# Patient Record
Sex: MALE | Race: BLACK OR AFRICAN AMERICAN | NOT HISPANIC OR LATINO | Employment: FULL TIME | RURAL
[De-identification: names, ages, dates, MRNs, and addresses within clinical notes are randomized per-mention and may not be internally consistent; named-entity substitution may affect disease eponyms.]

---

## 2020-04-22 ENCOUNTER — HISTORICAL (OUTPATIENT)
Dept: ADMINISTRATIVE | Facility: HOSPITAL | Age: 50
End: 2020-04-22

## 2021-05-10 ENCOUNTER — OFFICE VISIT (OUTPATIENT)
Dept: FAMILY MEDICINE | Facility: CLINIC | Age: 51
End: 2021-05-10
Payer: COMMERCIAL

## 2021-05-10 VITALS
HEART RATE: 93 BPM | BODY MASS INDEX: 34.36 KG/M2 | TEMPERATURE: 98 F | HEIGHT: 70 IN | DIASTOLIC BLOOD PRESSURE: 103 MMHG | SYSTOLIC BLOOD PRESSURE: 146 MMHG | OXYGEN SATURATION: 98 % | RESPIRATION RATE: 17 BRPM | WEIGHT: 240 LBS

## 2021-05-10 DIAGNOSIS — J32.9 SINUSITIS, UNSPECIFIED CHRONICITY, UNSPECIFIED LOCATION: Primary | ICD-10-CM

## 2021-05-10 PROCEDURE — 99214 PR OFFICE/OUTPT VISIT, EST, LEVL IV, 30-39 MIN: ICD-10-PCS | Mod: 25,,, | Performed by: FAMILY MEDICINE

## 2021-05-10 PROCEDURE — 99051 MED SERV EVE/WKEND/HOLIDAY: CPT | Mod: ,,, | Performed by: FAMILY MEDICINE

## 2021-05-10 PROCEDURE — 96372 PR INJECTION,THERAP/PROPH/DIAG2ST, IM OR SUBCUT: ICD-10-PCS | Mod: ,,, | Performed by: FAMILY MEDICINE

## 2021-05-10 PROCEDURE — 96372 THER/PROPH/DIAG INJ SC/IM: CPT | Mod: ,,, | Performed by: FAMILY MEDICINE

## 2021-05-10 PROCEDURE — 99051 PR MEDICAL SERVICES, EVE/WKEND/HOLIDAY: ICD-10-PCS | Mod: ,,, | Performed by: FAMILY MEDICINE

## 2021-05-10 PROCEDURE — 99214 OFFICE O/P EST MOD 30 MIN: CPT | Mod: 25,,, | Performed by: FAMILY MEDICINE

## 2021-05-10 RX ORDER — DEXAMETHASONE SODIUM PHOSPHATE 4 MG/ML
4 INJECTION, SOLUTION INTRA-ARTICULAR; INTRALESIONAL; INTRAMUSCULAR; INTRAVENOUS; SOFT TISSUE
Status: COMPLETED | OUTPATIENT
Start: 2021-05-10 | End: 2021-05-10

## 2021-05-10 RX ORDER — AMOXICILLIN AND CLAVULANATE POTASSIUM 875; 125 MG/1; MG/1
1 TABLET, FILM COATED ORAL 2 TIMES DAILY
Qty: 14 TABLET | Refills: 0 | Status: SHIPPED | OUTPATIENT
Start: 2021-05-10 | End: 2021-05-17

## 2021-05-10 RX ORDER — CEFTRIAXONE 1 G/1
1 INJECTION, POWDER, FOR SOLUTION INTRAMUSCULAR; INTRAVENOUS
Status: COMPLETED | OUTPATIENT
Start: 2021-05-10 | End: 2021-05-10

## 2021-05-10 RX ORDER — PREDNISONE 20 MG/1
20 TABLET ORAL DAILY
Qty: 5 TABLET | Refills: 0 | Status: SHIPPED | OUTPATIENT
Start: 2021-05-10 | End: 2021-05-15

## 2021-05-10 RX ADMIN — DEXAMETHASONE SODIUM PHOSPHATE 4 MG: 4 INJECTION, SOLUTION INTRA-ARTICULAR; INTRALESIONAL; INTRAMUSCULAR; INTRAVENOUS; SOFT TISSUE at 06:05

## 2021-05-10 RX ADMIN — CEFTRIAXONE 1 G: 1 INJECTION, POWDER, FOR SOLUTION INTRAMUSCULAR; INTRAVENOUS at 06:05

## 2022-11-08 ENCOUNTER — OFFICE VISIT (OUTPATIENT)
Dept: FAMILY MEDICINE | Facility: CLINIC | Age: 52
End: 2022-11-08
Payer: COMMERCIAL

## 2022-11-08 VITALS
RESPIRATION RATE: 16 BRPM | DIASTOLIC BLOOD PRESSURE: 81 MMHG | TEMPERATURE: 100 F | WEIGHT: 240 LBS | HEART RATE: 110 BPM | HEIGHT: 70 IN | BODY MASS INDEX: 34.36 KG/M2 | OXYGEN SATURATION: 95 % | SYSTOLIC BLOOD PRESSURE: 131 MMHG

## 2022-11-08 DIAGNOSIS — G44.209 TENSION HEADACHE: Primary | ICD-10-CM

## 2022-11-08 DIAGNOSIS — J40 BRONCHITIS: ICD-10-CM

## 2022-11-08 DIAGNOSIS — M62.838 MUSCLE SPASM: ICD-10-CM

## 2022-11-08 DIAGNOSIS — Z20.828 EXPOSURE TO VIRAL DISEASE: ICD-10-CM

## 2022-11-08 LAB
CTP QC/QA: YES
FLUAV AG NPH QL: NEGATIVE
FLUBV AG NPH QL: NEGATIVE
SARS-COV-2 AG RESP QL IA.RAPID: NEGATIVE

## 2022-11-08 PROCEDURE — 3075F SYST BP GE 130 - 139MM HG: CPT | Mod: ,,, | Performed by: FAMILY MEDICINE

## 2022-11-08 PROCEDURE — 99051 PR MEDICAL SERVICES, EVE/WKEND/HOLIDAY: ICD-10-PCS | Mod: ,,, | Performed by: FAMILY MEDICINE

## 2022-11-08 PROCEDURE — 87428 SARSCOV & INF VIR A&B AG IA: CPT | Mod: QW,,, | Performed by: FAMILY MEDICINE

## 2022-11-08 PROCEDURE — 3008F BODY MASS INDEX DOCD: CPT | Mod: ,,, | Performed by: FAMILY MEDICINE

## 2022-11-08 PROCEDURE — 3079F DIAST BP 80-89 MM HG: CPT | Mod: ,,, | Performed by: FAMILY MEDICINE

## 2022-11-08 PROCEDURE — 99214 OFFICE O/P EST MOD 30 MIN: CPT | Mod: 25,,, | Performed by: FAMILY MEDICINE

## 2022-11-08 PROCEDURE — 1159F PR MEDICATION LIST DOCUMENTED IN MEDICAL RECORD: ICD-10-PCS | Mod: ,,, | Performed by: FAMILY MEDICINE

## 2022-11-08 PROCEDURE — 3008F PR BODY MASS INDEX (BMI) DOCUMENTED: ICD-10-PCS | Mod: ,,, | Performed by: FAMILY MEDICINE

## 2022-11-08 PROCEDURE — 99051 MED SERV EVE/WKEND/HOLIDAY: CPT | Mod: ,,, | Performed by: FAMILY MEDICINE

## 2022-11-08 PROCEDURE — 96372 THER/PROPH/DIAG INJ SC/IM: CPT | Mod: ,,, | Performed by: FAMILY MEDICINE

## 2022-11-08 PROCEDURE — 99214 PR OFFICE/OUTPT VISIT, EST, LEVL IV, 30-39 MIN: ICD-10-PCS | Mod: 25,,, | Performed by: FAMILY MEDICINE

## 2022-11-08 PROCEDURE — 3079F PR MOST RECENT DIASTOLIC BLOOD PRESSURE 80-89 MM HG: ICD-10-PCS | Mod: ,,, | Performed by: FAMILY MEDICINE

## 2022-11-08 PROCEDURE — 1159F MED LIST DOCD IN RCRD: CPT | Mod: ,,, | Performed by: FAMILY MEDICINE

## 2022-11-08 PROCEDURE — 87428 POCT SARS-COV2 (COVID) WITH FLU ANTIGEN: ICD-10-PCS | Mod: QW,,, | Performed by: FAMILY MEDICINE

## 2022-11-08 PROCEDURE — 96372 PR INJECTION,THERAP/PROPH/DIAG2ST, IM OR SUBCUT: ICD-10-PCS | Mod: ,,, | Performed by: FAMILY MEDICINE

## 2022-11-08 PROCEDURE — 3075F PR MOST RECENT SYSTOLIC BLOOD PRESS GE 130-139MM HG: ICD-10-PCS | Mod: ,,, | Performed by: FAMILY MEDICINE

## 2022-11-08 PROCEDURE — 1160F RVW MEDS BY RX/DR IN RCRD: CPT | Mod: ,,, | Performed by: FAMILY MEDICINE

## 2022-11-08 PROCEDURE — 1160F PR REVIEW ALL MEDS BY PRESCRIBER/CLIN PHARMACIST DOCUMENTED: ICD-10-PCS | Mod: ,,, | Performed by: FAMILY MEDICINE

## 2022-11-08 RX ORDER — KETOROLAC TROMETHAMINE 30 MG/ML
60 INJECTION, SOLUTION INTRAMUSCULAR; INTRAVENOUS
Status: COMPLETED | OUTPATIENT
Start: 2022-11-08 | End: 2022-11-08

## 2022-11-08 RX ORDER — DEXAMETHASONE SODIUM PHOSPHATE 4 MG/ML
4 INJECTION, SOLUTION INTRA-ARTICULAR; INTRALESIONAL; INTRAMUSCULAR; INTRAVENOUS; SOFT TISSUE
Status: COMPLETED | OUTPATIENT
Start: 2022-11-08 | End: 2022-11-08

## 2022-11-08 RX ORDER — TIZANIDINE 4 MG/1
4 TABLET ORAL 3 TIMES DAILY PRN
Qty: 20 TABLET | Refills: 0 | Status: SHIPPED | OUTPATIENT
Start: 2022-11-08 | End: 2022-11-18

## 2022-11-08 RX ORDER — IBUPROFEN 800 MG/1
800 TABLET ORAL 3 TIMES DAILY PRN
Qty: 20 TABLET | Refills: 0 | Status: SHIPPED | OUTPATIENT
Start: 2022-11-08

## 2022-11-08 RX ORDER — AZITHROMYCIN 250 MG/1
TABLET, FILM COATED ORAL
Qty: 6 TABLET | Refills: 0 | Status: SHIPPED | OUTPATIENT
Start: 2022-11-08

## 2022-11-08 RX ORDER — PREDNISONE 20 MG/1
40 TABLET ORAL DAILY
Qty: 10 TABLET | Refills: 0 | Status: SHIPPED | OUTPATIENT
Start: 2022-11-08 | End: 2022-11-13

## 2022-11-08 RX ADMIN — DEXAMETHASONE SODIUM PHOSPHATE 4 MG: 4 INJECTION, SOLUTION INTRA-ARTICULAR; INTRALESIONAL; INTRAMUSCULAR; INTRAVENOUS; SOFT TISSUE at 07:11

## 2022-11-08 RX ADMIN — KETOROLAC TROMETHAMINE 60 MG: 30 INJECTION, SOLUTION INTRAMUSCULAR; INTRAVENOUS at 07:11

## 2022-11-08 NOTE — LETTER
November 8, 2022      [facility]  [address] [place] [address]  Phone: [phone]  Fax: [phone]       Patient: Zachariah Aguilar   YOB: 1970  Date of Visit: 11/08/2022    To Whom It May Concern:    Ilya Aguilar  was at [facility] on 11/08/2022. The patient may return to work/school on 11/10/22 with no restrictions. If you have any questions or concerns, or if I can be of further assistance, please do not hesitate to contact me.    Sincerely,    Petra Henry LPN

## 2022-11-09 NOTE — PROGRESS NOTES
Subjective:       Patient ID: Zachariah Aguilar is a 52 y.o. male.    Chief Complaint: Headache (X 1 week. ), Dizziness, Cough, and Chest Congestion    Pt reports 1 week of intermittent posterior headache and neck pain extending to upper thoracic back pain. Pt also with cough, congestion, drainage during this time. Denies visual changes, no unilateral weakness.     Review of Systems   Constitutional:  Negative for activity change, appetite change, chills, diaphoresis, fatigue, fever and unexpected weight change.   HENT:  Positive for postnasal drip and rhinorrhea. Negative for nasal congestion, dental problem, drooling, ear discharge, ear pain, facial swelling, hearing loss, mouth sores, nosebleeds, sinus pressure/congestion, sneezing, sore throat, tinnitus, trouble swallowing, voice change and goiter.    Eyes:  Negative for photophobia, pain, discharge, redness, itching and visual disturbance.   Respiratory:  Negative for apnea, cough, choking, chest tightness, shortness of breath, wheezing and stridor.    Cardiovascular:  Negative for chest pain, palpitations, leg swelling and claudication.   Gastrointestinal:  Negative for abdominal distention, abdominal pain, anal bleeding, blood in stool, change in bowel habit, constipation, diarrhea, nausea, vomiting, reflux, fecal incontinence and change in bowel habit.   Endocrine: Negative for cold intolerance, heat intolerance, polydipsia, polyphagia and polyuria.   Genitourinary:  Negative for bladder incontinence, decreased urine volume, difficulty urinating, discharge, dysuria, enuresis, erectile dysfunction, flank pain, frequency, genital sores, hematuria, penile pain, testicular pain and urgency.   Musculoskeletal:  Positive for arthralgias, back pain and myalgias. Negative for gait problem, joint swelling, leg pain, neck pain, neck stiffness and joint deformity.   Integumentary:  Negative for pallor, rash, wound and mole/lesion.   Allergic/Immunologic: Negative for  environmental allergies, food allergies and frequent infections.   Neurological:  Positive for headaches. Negative for dizziness, vertigo, tremors, seizures, syncope, facial asymmetry, speech difficulty, weakness, light-headedness, numbness, coordination difficulties, memory loss and coordination difficulties.   Hematological:  Negative for adenopathy. Does not bruise/bleed easily.   Psychiatric/Behavioral:  Negative for agitation, behavioral problems, confusion, decreased concentration, dysphoric mood, hallucinations, self-injury, sleep disturbance and suicidal ideas. The patient is not nervous/anxious and is not hyperactive.        Objective:      Physical Exam  Vitals reviewed.   Constitutional:       Appearance: Normal appearance. He is normal weight.   HENT:      Head: Normocephalic and atraumatic.      Right Ear: Tympanic membrane and ear canal normal.      Left Ear: Tympanic membrane, ear canal and external ear normal.      Nose: Congestion and rhinorrhea present.      Mouth/Throat:      Mouth: Mucous membranes are moist.      Pharynx: Oropharynx is clear.   Eyes:      Extraocular Movements: Extraocular movements intact.      Conjunctiva/sclera: Conjunctivae normal.      Pupils: Pupils are equal, round, and reactive to light.   Cardiovascular:      Rate and Rhythm: Normal rate and regular rhythm.      Pulses: Normal pulses.      Heart sounds: Normal heart sounds.   Pulmonary:      Effort: Pulmonary effort is normal.      Breath sounds: Normal breath sounds.   Abdominal:      General: Abdomen is flat. Bowel sounds are normal.      Palpations: Abdomen is soft.   Musculoskeletal:         General: Tenderness present. Normal range of motion.      Cervical back: Normal range of motion and neck supple.      Comments: Posterior scalp ttp. Posterior neck ttp.    Skin:     General: Skin is warm and dry.   Neurological:      General: No focal deficit present.      Mental Status: He is alert and oriented to person,  place, and time. Mental status is at baseline.   Psychiatric:         Mood and Affect: Mood normal.         Behavior: Behavior normal.         Thought Content: Thought content normal.         Judgment: Judgment normal.       Assessment:       1. Tension headache    2. Exposure to viral disease    3. Muscle spasm    4. Bronchitis          Plan:     Tension headache  -     dexAMETHasone injection 4 mg  -     ketorolac injection 60 mg    Exposure to viral disease  -     POCT SARS-COV2 (COVID) with Flu Antigen    Muscle spasm    Bronchitis    Other orders  -     tiZANidine (ZANAFLEX) 4 MG tablet; Take 1 tablet (4 mg total) by mouth 3 (three) times daily as needed (spasm).  Dispense: 20 tablet; Refill: 0  -     ibuprofen (ADVIL,MOTRIN) 800 MG tablet; Take 1 tablet (800 mg total) by mouth 3 (three) times daily as needed for Pain.  Dispense: 20 tablet; Refill: 0  -     azithromycin (Z-ANTIONETTE) 250 MG tablet; Take 2 tablets by mouth on day 1; Take 1 tablet by mouth on days 2-5  Dispense: 6 tablet; Refill: 0  -     predniSONE (DELTASONE) 20 MG tablet; Take 2 tablets (40 mg total) by mouth once daily. for 5 days  Dispense: 10 tablet; Refill: 0

## 2022-11-23 ENCOUNTER — OFFICE VISIT (OUTPATIENT)
Dept: FAMILY MEDICINE | Facility: CLINIC | Age: 52
End: 2022-11-23
Payer: COMMERCIAL

## 2022-11-23 VITALS
SYSTOLIC BLOOD PRESSURE: 130 MMHG | BODY MASS INDEX: 34.53 KG/M2 | TEMPERATURE: 102 F | DIASTOLIC BLOOD PRESSURE: 76 MMHG | RESPIRATION RATE: 19 BRPM | HEART RATE: 108 BPM | HEIGHT: 70 IN | WEIGHT: 241.19 LBS | OXYGEN SATURATION: 99 %

## 2022-11-23 DIAGNOSIS — Z11.52 ENCOUNTER FOR SCREENING LABORATORY TESTING FOR COVID-19 VIRUS: ICD-10-CM

## 2022-11-23 DIAGNOSIS — J11.1 INFLUENZA: Primary | ICD-10-CM

## 2022-11-23 LAB
CTP QC/QA: YES
FLUAV AG NPH QL: POSITIVE
FLUBV AG NPH QL: NEGATIVE
SARS-COV-2 AG RESP QL IA.RAPID: NEGATIVE

## 2022-11-23 PROCEDURE — 3075F PR MOST RECENT SYSTOLIC BLOOD PRESS GE 130-139MM HG: ICD-10-PCS | Mod: ,,, | Performed by: FAMILY MEDICINE

## 2022-11-23 PROCEDURE — 87428 SARSCOV & INF VIR A&B AG IA: CPT | Mod: QW,,, | Performed by: FAMILY MEDICINE

## 2022-11-23 PROCEDURE — 3078F DIAST BP <80 MM HG: CPT | Mod: ,,, | Performed by: FAMILY MEDICINE

## 2022-11-23 PROCEDURE — 1160F RVW MEDS BY RX/DR IN RCRD: CPT | Mod: ,,, | Performed by: FAMILY MEDICINE

## 2022-11-23 PROCEDURE — 96372 THER/PROPH/DIAG INJ SC/IM: CPT | Mod: ,,, | Performed by: FAMILY MEDICINE

## 2022-11-23 PROCEDURE — 3078F PR MOST RECENT DIASTOLIC BLOOD PRESSURE < 80 MM HG: ICD-10-PCS | Mod: ,,, | Performed by: FAMILY MEDICINE

## 2022-11-23 PROCEDURE — 99214 OFFICE O/P EST MOD 30 MIN: CPT | Mod: 25,,, | Performed by: FAMILY MEDICINE

## 2022-11-23 PROCEDURE — 87428 POCT SARS-COV2 (COVID) WITH FLU ANTIGEN: ICD-10-PCS | Mod: QW,,, | Performed by: FAMILY MEDICINE

## 2022-11-23 PROCEDURE — 99214 PR OFFICE/OUTPT VISIT, EST, LEVL IV, 30-39 MIN: ICD-10-PCS | Mod: 25,,, | Performed by: FAMILY MEDICINE

## 2022-11-23 PROCEDURE — 1159F PR MEDICATION LIST DOCUMENTED IN MEDICAL RECORD: ICD-10-PCS | Mod: ,,, | Performed by: FAMILY MEDICINE

## 2022-11-23 PROCEDURE — 1160F PR REVIEW ALL MEDS BY PRESCRIBER/CLIN PHARMACIST DOCUMENTED: ICD-10-PCS | Mod: ,,, | Performed by: FAMILY MEDICINE

## 2022-11-23 PROCEDURE — 96372 PR INJECTION,THERAP/PROPH/DIAG2ST, IM OR SUBCUT: ICD-10-PCS | Mod: ,,, | Performed by: FAMILY MEDICINE

## 2022-11-23 PROCEDURE — 3008F PR BODY MASS INDEX (BMI) DOCUMENTED: ICD-10-PCS | Mod: ,,, | Performed by: FAMILY MEDICINE

## 2022-11-23 PROCEDURE — 3075F SYST BP GE 130 - 139MM HG: CPT | Mod: ,,, | Performed by: FAMILY MEDICINE

## 2022-11-23 PROCEDURE — 1159F MED LIST DOCD IN RCRD: CPT | Mod: ,,, | Performed by: FAMILY MEDICINE

## 2022-11-23 PROCEDURE — 3008F BODY MASS INDEX DOCD: CPT | Mod: ,,, | Performed by: FAMILY MEDICINE

## 2022-11-23 RX ORDER — DEXAMETHASONE SODIUM PHOSPHATE 4 MG/ML
4 INJECTION, SOLUTION INTRA-ARTICULAR; INTRALESIONAL; INTRAMUSCULAR; INTRAVENOUS; SOFT TISSUE
Status: COMPLETED | OUTPATIENT
Start: 2022-11-23 | End: 2022-11-23

## 2022-11-23 RX ORDER — PREDNISONE 20 MG/1
20 TABLET ORAL DAILY
Qty: 5 TABLET | Refills: 0 | Status: SHIPPED | OUTPATIENT
Start: 2022-11-23 | End: 2022-11-28

## 2022-11-23 RX ORDER — BENZONATATE 100 MG/1
100 CAPSULE ORAL 3 TIMES DAILY PRN
Qty: 20 CAPSULE | Refills: 0 | Status: SHIPPED | OUTPATIENT
Start: 2022-11-23

## 2022-11-23 RX ORDER — OSELTAMIVIR PHOSPHATE 75 MG/1
75 CAPSULE ORAL 2 TIMES DAILY
Qty: 10 CAPSULE | Refills: 0 | Status: SHIPPED | OUTPATIENT
Start: 2022-11-23 | End: 2022-11-28

## 2022-11-23 RX ADMIN — DEXAMETHASONE SODIUM PHOSPHATE 4 MG: 4 INJECTION, SOLUTION INTRA-ARTICULAR; INTRALESIONAL; INTRAMUSCULAR; INTRAVENOUS; SOFT TISSUE at 04:11

## 2022-11-23 NOTE — PROGRESS NOTES
Subjective:       Patient ID: Zachariah Aguilar is a 52 y.o. male.    Chief Complaint: Fever (Fever/chills/body aches/runny nose/cough symptoms started last night)    HPI  Review of Systems   Constitutional:  Positive for fatigue and fever. Negative for activity change, appetite change, chills, diaphoresis and unexpected weight change.   HENT:  Positive for nasal congestion, postnasal drip, rhinorrhea and sinus pressure/congestion. Negative for dental problem, drooling, ear discharge, ear pain, facial swelling, hearing loss, mouth sores, nosebleeds, sneezing, sore throat, tinnitus, trouble swallowing, voice change and goiter.    Eyes:  Negative for photophobia, pain, discharge, redness, itching and visual disturbance.   Respiratory:  Positive for cough. Negative for apnea, choking, chest tightness, shortness of breath, wheezing and stridor.    Cardiovascular:  Negative for chest pain, palpitations, leg swelling and claudication.   Gastrointestinal:  Negative for abdominal distention, abdominal pain, anal bleeding, blood in stool, change in bowel habit, constipation, diarrhea, nausea, vomiting, reflux, fecal incontinence and change in bowel habit.   Endocrine: Negative for cold intolerance, heat intolerance, polydipsia, polyphagia and polyuria.   Genitourinary:  Negative for bladder incontinence, decreased urine volume, difficulty urinating, discharge, dysuria, enuresis, erectile dysfunction, flank pain, frequency, genital sores, hematuria, penile pain, testicular pain and urgency.   Musculoskeletal:  Negative for arthralgias, back pain, gait problem, joint swelling, leg pain, myalgias, neck pain, neck stiffness and joint deformity.   Integumentary:  Negative for pallor, rash, wound and mole/lesion.   Allergic/Immunologic: Negative for environmental allergies, food allergies and frequent infections.   Neurological:  Negative for dizziness, vertigo, tremors, seizures, syncope, facial asymmetry, speech difficulty,  weakness, light-headedness, numbness, headaches, coordination difficulties, memory loss and coordination difficulties.   Hematological:  Negative for adenopathy. Does not bruise/bleed easily.   Psychiatric/Behavioral:  Negative for agitation, behavioral problems, confusion, decreased concentration, dysphoric mood, hallucinations, self-injury, sleep disturbance and suicidal ideas. The patient is not nervous/anxious and is not hyperactive.        Objective:      Physical Exam  Vitals reviewed.   Constitutional:       Appearance: Normal appearance. He is normal weight.   HENT:      Head: Normocephalic and atraumatic.      Right Ear: Tympanic membrane and ear canal normal.      Left Ear: Tympanic membrane, ear canal and external ear normal.      Nose: Congestion and rhinorrhea present.      Mouth/Throat:      Mouth: Mucous membranes are moist.      Pharynx: Oropharynx is clear. Posterior oropharyngeal erythema present.   Eyes:      Extraocular Movements: Extraocular movements intact.      Conjunctiva/sclera: Conjunctivae normal.      Pupils: Pupils are equal, round, and reactive to light.   Cardiovascular:      Rate and Rhythm: Normal rate and regular rhythm.      Pulses: Normal pulses.      Heart sounds: Normal heart sounds.   Pulmonary:      Effort: Pulmonary effort is normal.      Breath sounds: Normal breath sounds.   Abdominal:      General: Abdomen is flat. Bowel sounds are normal.      Palpations: Abdomen is soft.   Musculoskeletal:         General: Normal range of motion.      Cervical back: Normal range of motion and neck supple.   Skin:     General: Skin is warm and dry.   Neurological:      General: No focal deficit present.      Mental Status: He is alert and oriented to person, place, and time. Mental status is at baseline.   Psychiatric:         Mood and Affect: Mood normal.         Behavior: Behavior normal.         Thought Content: Thought content normal.         Judgment: Judgment normal.        Assessment:       1. Influenza    2. Encounter for screening laboratory testing for COVID-19 virus          Plan:     Influenza  -     dexAMETHasone injection 4 mg    Encounter for screening laboratory testing for COVID-19 virus  -     POCT SARS-COV2 (COVID) with Flu Antigen    Other orders  -     predniSONE (DELTASONE) 20 MG tablet; Take 1 tablet (20 mg total) by mouth once daily. for 5 days  Dispense: 5 tablet; Refill: 0  -     oseltamivir (TAMIFLU) 75 MG capsule; Take 1 capsule (75 mg total) by mouth 2 (two) times daily. for 5 days  Dispense: 10 capsule; Refill: 0  -     benzonatate (TESSALON) 100 MG capsule; Take 1 capsule (100 mg total) by mouth 3 (three) times daily as needed for Cough.  Dispense: 20 capsule; Refill: 0

## 2023-02-10 ENCOUNTER — OFFICE VISIT (OUTPATIENT)
Dept: FAMILY MEDICINE | Facility: CLINIC | Age: 53
End: 2023-02-10
Payer: COMMERCIAL

## 2023-02-10 VITALS
TEMPERATURE: 98 F | OXYGEN SATURATION: 99 % | DIASTOLIC BLOOD PRESSURE: 94 MMHG | HEART RATE: 82 BPM | BODY MASS INDEX: 32.64 KG/M2 | SYSTOLIC BLOOD PRESSURE: 137 MMHG | HEIGHT: 70 IN | WEIGHT: 228 LBS | RESPIRATION RATE: 20 BRPM

## 2023-02-10 DIAGNOSIS — U07.1 COVID-19 VIRUS INFECTION: ICD-10-CM

## 2023-02-10 DIAGNOSIS — Z20.828 CONTACT WITH OR EXPOSURE TO VIRAL DISEASE: Primary | ICD-10-CM

## 2023-02-10 LAB
CTP QC/QA: YES
SARS-COV-2 AG RESP QL IA.RAPID: POSITIVE

## 2023-02-10 PROCEDURE — 3080F PR MOST RECENT DIASTOLIC BLOOD PRESSURE >= 90 MM HG: ICD-10-PCS | Mod: ,,, | Performed by: FAMILY MEDICINE

## 2023-02-10 PROCEDURE — 3075F PR MOST RECENT SYSTOLIC BLOOD PRESS GE 130-139MM HG: ICD-10-PCS | Mod: ,,, | Performed by: FAMILY MEDICINE

## 2023-02-10 PROCEDURE — 3008F BODY MASS INDEX DOCD: CPT | Mod: ,,, | Performed by: FAMILY MEDICINE

## 2023-02-10 PROCEDURE — 3080F DIAST BP >= 90 MM HG: CPT | Mod: ,,, | Performed by: FAMILY MEDICINE

## 2023-02-10 PROCEDURE — 87426 SARS CORONAVIRUS 2 ANTIGEN POCT: ICD-10-PCS | Mod: QW,,, | Performed by: FAMILY MEDICINE

## 2023-02-10 PROCEDURE — 99213 OFFICE O/P EST LOW 20 MIN: CPT | Mod: ,,, | Performed by: FAMILY MEDICINE

## 2023-02-10 PROCEDURE — 99213 PR OFFICE/OUTPT VISIT, EST, LEVL III, 20-29 MIN: ICD-10-PCS | Mod: ,,, | Performed by: FAMILY MEDICINE

## 2023-02-10 PROCEDURE — 87426 SARSCOV CORONAVIRUS AG IA: CPT | Mod: QW,,, | Performed by: FAMILY MEDICINE

## 2023-02-10 PROCEDURE — 1159F MED LIST DOCD IN RCRD: CPT | Mod: ,,, | Performed by: FAMILY MEDICINE

## 2023-02-10 PROCEDURE — 3008F PR BODY MASS INDEX (BMI) DOCUMENTED: ICD-10-PCS | Mod: ,,, | Performed by: FAMILY MEDICINE

## 2023-02-10 PROCEDURE — 1159F PR MEDICATION LIST DOCUMENTED IN MEDICAL RECORD: ICD-10-PCS | Mod: ,,, | Performed by: FAMILY MEDICINE

## 2023-02-10 PROCEDURE — 3075F SYST BP GE 130 - 139MM HG: CPT | Mod: ,,, | Performed by: FAMILY MEDICINE

## 2023-02-10 NOTE — LETTER
February 10, 2023      [facility]  [address] [facility] [place] [address]  Phone: [phone]  Fax: [phone]       Patient: Zachariah Aguilar   YOB: 1970  Date of Visit: 02/10/2023    To Whom It May Concern:    Ilya Aguilar  was at [facility] on 02/10/2023. The patient may return to work/school on 02/17/2023 with no restrictions. If you have any questions or concerns, or if I can be of further assistance, please do not hesitate to contact me.    Sincerely,    Gabo Zaragoza MD

## 2023-02-10 NOTE — PROGRESS NOTES
Subjective:       Patient ID: Zachariah Aguilar is a 53 y.o. male.    Chief Complaint: COVID-19 Concerns (Cough, congestion, positive home covid test)    SYMPTOMS BEGAN LITTLE OVER 2 DAYS AGO.  NO SHORTNESS OF BREATH    Review of Systems      Objective:      Physical Exam  Constitutional:       Appearance: Normal appearance. He is ill-appearing (mildly). He is not toxic-appearing.   HENT:      Nose: Congestion and rhinorrhea present.      Mouth/Throat:      Pharynx: No posterior oropharyngeal erythema.   Cardiovascular:      Rate and Rhythm: Normal rate and regular rhythm.   Pulmonary:      Effort: Pulmonary effort is normal.      Breath sounds: Normal breath sounds.   Neurological:      Mental Status: He is alert.       Assessment:       Problem List Items Addressed This Visit    None  Visit Diagnoses       Contact with or exposure to viral disease    -  Primary    Relevant Orders    SARS Coronavirus 2 Antigen, POCT (Completed)    COVID-19 virus infection                  Plan:       Paxlovid.  Quarantine for 7 more days

## 2023-07-13 ENCOUNTER — OFFICE VISIT (OUTPATIENT)
Dept: FAMILY MEDICINE | Facility: CLINIC | Age: 53
End: 2023-07-13
Payer: COMMERCIAL

## 2023-07-13 VITALS
WEIGHT: 237.38 LBS | SYSTOLIC BLOOD PRESSURE: 134 MMHG | BODY MASS INDEX: 33.98 KG/M2 | TEMPERATURE: 99 F | OXYGEN SATURATION: 99 % | HEIGHT: 70 IN | HEART RATE: 75 BPM | DIASTOLIC BLOOD PRESSURE: 87 MMHG

## 2023-07-13 DIAGNOSIS — S57.82XA CRUSHING INJURY OF LEFT FOREARM, INITIAL ENCOUNTER: Primary | ICD-10-CM

## 2023-07-13 PROCEDURE — 99213 PR OFFICE/OUTPT VISIT, EST, LEVL III, 20-29 MIN: ICD-10-PCS | Mod: ,,, | Performed by: FAMILY MEDICINE

## 2023-07-13 PROCEDURE — 3075F PR MOST RECENT SYSTOLIC BLOOD PRESS GE 130-139MM HG: ICD-10-PCS | Mod: ,,, | Performed by: FAMILY MEDICINE

## 2023-07-13 PROCEDURE — 3079F PR MOST RECENT DIASTOLIC BLOOD PRESSURE 80-89 MM HG: ICD-10-PCS | Mod: ,,, | Performed by: FAMILY MEDICINE

## 2023-07-13 PROCEDURE — 99051 MED SERV EVE/WKEND/HOLIDAY: CPT | Mod: ,,, | Performed by: FAMILY MEDICINE

## 2023-07-13 PROCEDURE — 3008F BODY MASS INDEX DOCD: CPT | Mod: ,,, | Performed by: FAMILY MEDICINE

## 2023-07-13 PROCEDURE — 3079F DIAST BP 80-89 MM HG: CPT | Mod: ,,, | Performed by: FAMILY MEDICINE

## 2023-07-13 PROCEDURE — 99213 OFFICE O/P EST LOW 20 MIN: CPT | Mod: ,,, | Performed by: FAMILY MEDICINE

## 2023-07-13 PROCEDURE — 99051 PR MEDICAL SERVICES, EVE/WKEND/HOLIDAY: ICD-10-PCS | Mod: ,,, | Performed by: FAMILY MEDICINE

## 2023-07-13 PROCEDURE — 3008F PR BODY MASS INDEX (BMI) DOCUMENTED: ICD-10-PCS | Mod: ,,, | Performed by: FAMILY MEDICINE

## 2023-07-13 PROCEDURE — 3075F SYST BP GE 130 - 139MM HG: CPT | Mod: ,,, | Performed by: FAMILY MEDICINE

## 2023-07-13 PROCEDURE — 1159F MED LIST DOCD IN RCRD: CPT | Mod: ,,, | Performed by: FAMILY MEDICINE

## 2023-07-13 PROCEDURE — 1159F PR MEDICATION LIST DOCUMENTED IN MEDICAL RECORD: ICD-10-PCS | Mod: ,,, | Performed by: FAMILY MEDICINE
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
## 2023-12-20 ENCOUNTER — OFFICE VISIT (OUTPATIENT)
Dept: FAMILY MEDICINE | Facility: CLINIC | Age: 53
End: 2023-12-20
Payer: COMMERCIAL

## 2023-12-20 VITALS
WEIGHT: 240 LBS | BODY MASS INDEX: 34.36 KG/M2 | TEMPERATURE: 98 F | DIASTOLIC BLOOD PRESSURE: 84 MMHG | OXYGEN SATURATION: 98 % | HEIGHT: 70 IN | HEART RATE: 77 BPM | SYSTOLIC BLOOD PRESSURE: 136 MMHG

## 2023-12-20 DIAGNOSIS — Z20.828 EXPOSURE TO VIRAL DISEASE: ICD-10-CM

## 2023-12-20 DIAGNOSIS — J32.9 SINUSITIS, UNSPECIFIED CHRONICITY, UNSPECIFIED LOCATION: ICD-10-CM

## 2023-12-20 DIAGNOSIS — R10.84 GENERALIZED ABDOMINAL PAIN: Primary | ICD-10-CM

## 2023-12-20 LAB
CTP QC/QA: YES
CTP QC/QA: YES
FLUAV AG NPH QL: NEGATIVE
FLUBV AG NPH QL: NEGATIVE
SARS-COV-2 AG RESP QL IA.RAPID: NEGATIVE

## 2023-12-20 PROCEDURE — 3008F BODY MASS INDEX DOCD: CPT | Mod: ,,, | Performed by: FAMILY MEDICINE

## 2023-12-20 PROCEDURE — 87804 INFLUENZA ASSAY W/OPTIC: CPT | Mod: 59,QW,, | Performed by: FAMILY MEDICINE

## 2023-12-20 PROCEDURE — 3079F DIAST BP 80-89 MM HG: CPT | Mod: ,,, | Performed by: FAMILY MEDICINE

## 2023-12-20 PROCEDURE — 1159F PR MEDICATION LIST DOCUMENTED IN MEDICAL RECORD: ICD-10-PCS | Mod: ,,, | Performed by: FAMILY MEDICINE

## 2023-12-20 PROCEDURE — 87426 SARSCOV CORONAVIRUS AG IA: CPT | Mod: QW,,, | Performed by: FAMILY MEDICINE

## 2023-12-20 PROCEDURE — 99214 PR OFFICE/OUTPT VISIT, EST, LEVL IV, 30-39 MIN: ICD-10-PCS | Mod: 25,,, | Performed by: FAMILY MEDICINE

## 2023-12-20 PROCEDURE — 1159F MED LIST DOCD IN RCRD: CPT | Mod: ,,, | Performed by: FAMILY MEDICINE

## 2023-12-20 PROCEDURE — 3079F PR MOST RECENT DIASTOLIC BLOOD PRESSURE 80-89 MM HG: ICD-10-PCS | Mod: ,,, | Performed by: FAMILY MEDICINE

## 2023-12-20 PROCEDURE — 96372 PR INJECTION,THERAP/PROPH/DIAG2ST, IM OR SUBCUT: ICD-10-PCS | Mod: ,,, | Performed by: FAMILY MEDICINE

## 2023-12-20 PROCEDURE — 96372 THER/PROPH/DIAG INJ SC/IM: CPT | Mod: ,,, | Performed by: FAMILY MEDICINE

## 2023-12-20 PROCEDURE — 1160F RVW MEDS BY RX/DR IN RCRD: CPT | Mod: ,,, | Performed by: FAMILY MEDICINE

## 2023-12-20 PROCEDURE — 3075F PR MOST RECENT SYSTOLIC BLOOD PRESS GE 130-139MM HG: ICD-10-PCS | Mod: ,,, | Performed by: FAMILY MEDICINE

## 2023-12-20 PROCEDURE — 1160F PR REVIEW ALL MEDS BY PRESCRIBER/CLIN PHARMACIST DOCUMENTED: ICD-10-PCS | Mod: ,,, | Performed by: FAMILY MEDICINE

## 2023-12-20 PROCEDURE — 3008F PR BODY MASS INDEX (BMI) DOCUMENTED: ICD-10-PCS | Mod: ,,, | Performed by: FAMILY MEDICINE

## 2023-12-20 PROCEDURE — 87804 POCT INFLUENZA A/B: ICD-10-PCS | Mod: 59,QW,, | Performed by: FAMILY MEDICINE

## 2023-12-20 PROCEDURE — 99214 OFFICE O/P EST MOD 30 MIN: CPT | Mod: 25,,, | Performed by: FAMILY MEDICINE

## 2023-12-20 PROCEDURE — 3075F SYST BP GE 130 - 139MM HG: CPT | Mod: ,,, | Performed by: FAMILY MEDICINE

## 2023-12-20 PROCEDURE — 87426 SARS CORONAVIRUS 2 ANTIGEN POCT: ICD-10-PCS | Mod: QW,,, | Performed by: FAMILY MEDICINE

## 2023-12-20 RX ORDER — POLYETHYLENE GLYCOL 3350 17 G/17G
POWDER, FOR SOLUTION ORAL
Qty: 507 G | Refills: 0 | Status: SHIPPED | OUTPATIENT
Start: 2023-12-20

## 2023-12-20 RX ORDER — BISACODYL 5 MG
15 TABLET, DELAYED RELEASE (ENTERIC COATED) ORAL ONCE
Qty: 3 TABLET | Refills: 0 | Status: SHIPPED | OUTPATIENT
Start: 2023-12-20 | End: 2023-12-20

## 2023-12-20 RX ORDER — PREDNISONE 20 MG/1
20 TABLET ORAL DAILY
Qty: 5 TABLET | Refills: 0 | Status: SHIPPED | OUTPATIENT
Start: 2023-12-20 | End: 2023-12-25

## 2023-12-20 RX ORDER — DEXAMETHASONE SODIUM PHOSPHATE 4 MG/ML
4 INJECTION, SOLUTION INTRA-ARTICULAR; INTRALESIONAL; INTRAMUSCULAR; INTRAVENOUS; SOFT TISSUE
Status: COMPLETED | OUTPATIENT
Start: 2023-12-20 | End: 2023-12-20

## 2023-12-20 RX ORDER — AMOXICILLIN AND CLAVULANATE POTASSIUM 875; 125 MG/1; MG/1
1 TABLET, FILM COATED ORAL 2 TIMES DAILY
Qty: 14 TABLET | Refills: 0 | Status: SHIPPED | OUTPATIENT
Start: 2023-12-20 | End: 2023-12-27

## 2023-12-20 RX ORDER — ONDANSETRON 4 MG/1
4 TABLET, FILM COATED ORAL EVERY 8 HOURS PRN
Qty: 10 TABLET | Refills: 0 | Status: SHIPPED | OUTPATIENT
Start: 2023-12-20

## 2023-12-20 RX ADMIN — DEXAMETHASONE SODIUM PHOSPHATE 4 MG: 4 INJECTION, SOLUTION INTRA-ARTICULAR; INTRALESIONAL; INTRAMUSCULAR; INTRAVENOUS; SOFT TISSUE at 07:12

## 2023-12-20 NOTE — LETTER
December 20, 2023      [facility]  [address] [facility] [place] [address]  Phone: [phone]  Fax: [phone]       Patient: Zachariah Aguilar   YOB: 1970  Date of Visit: 12/20/2023    To Whom It May Concern:    Ilya Aguilar  was at [facility] on 12/20/2023. The patient may return to work/school on 12/26/2023 with no restrictions. If you have any questions or concerns, or if I can be of further assistance, please do not hesitate to contact me.    Sincerely,    Dr. Ramu Ho II

## 2023-12-21 NOTE — PROGRESS NOTES
Subjective:       Patient ID: Zachariah Aguilar is a 53 y.o. male.    Chief Complaint: Abdominal Pain and Cough    Pt with left sided abdominal swelling and left inguinal swelling x 5 days, intermittent pain, last episode of pain was last night.     Pt also with cough and congestion x 1 week.     Abdominal Pain  Pertinent negatives include no arthralgias, constipation, diarrhea, dysuria, fever, frequency, headaches, hematuria, myalgias, nausea or vomiting.   Cough  Pertinent negatives include no chest pain, chills, ear pain, eye redness, fever, headaches, myalgias, postnasal drip, rash, rhinorrhea, sore throat, shortness of breath or wheezing. There is no history of environmental allergies.     Review of Systems   Constitutional:  Negative for activity change, appetite change, chills, diaphoresis, fatigue, fever and unexpected weight change.   HENT:  Negative for nasal congestion, dental problem, drooling, ear discharge, ear pain, facial swelling, hearing loss, mouth sores, nosebleeds, postnasal drip, rhinorrhea, sinus pressure/congestion, sneezing, sore throat, tinnitus, trouble swallowing, voice change and goiter.    Eyes:  Negative for photophobia, pain, discharge, redness, itching and visual disturbance.   Respiratory:  Positive for cough. Negative for apnea, choking, chest tightness, shortness of breath, wheezing and stridor.    Cardiovascular:  Negative for chest pain, palpitations, leg swelling and claudication.   Gastrointestinal:  Positive for abdominal pain. Negative for abdominal distention, anal bleeding, blood in stool, change in bowel habit, constipation, diarrhea, nausea, vomiting, reflux and fecal incontinence.   Endocrine: Negative for cold intolerance, heat intolerance, polydipsia, polyphagia and polyuria.   Genitourinary:  Negative for bladder incontinence, decreased urine volume, difficulty urinating, discharge, dysuria, enuresis, erectile dysfunction, flank pain, frequency, genital sores,  hematuria, penile pain, testicular pain and urgency.   Musculoskeletal:  Negative for arthralgias, back pain, gait problem, joint swelling, leg pain, myalgias, neck pain, neck stiffness and joint deformity.   Integumentary:  Negative for pallor, rash, wound and mole/lesion.   Allergic/Immunologic: Negative for environmental allergies, food allergies and frequent infections.   Neurological:  Negative for dizziness, vertigo, tremors, seizures, syncope, facial asymmetry, speech difficulty, weakness, light-headedness, numbness, headaches, memory loss and coordination difficulties.   Hematological:  Negative for adenopathy. Does not bruise/bleed easily.   Psychiatric/Behavioral:  Negative for agitation, behavioral problems, confusion, decreased concentration, dysphoric mood, hallucinations, self-injury, sleep disturbance and suicidal ideas. The patient is not nervous/anxious and is not hyperactive.          Objective:      Physical Exam  Vitals reviewed.   Constitutional:       Appearance: Normal appearance. He is normal weight.   HENT:      Head: Normocephalic and atraumatic.      Right Ear: Tympanic membrane and ear canal normal.      Left Ear: Tympanic membrane, ear canal and external ear normal.      Nose: Rhinorrhea present.      Mouth/Throat:      Mouth: Mucous membranes are moist.      Pharynx: Oropharynx is clear.   Eyes:      Extraocular Movements: Extraocular movements intact.      Conjunctiva/sclera: Conjunctivae normal.      Pupils: Pupils are equal, round, and reactive to light.   Cardiovascular:      Rate and Rhythm: Normal rate and regular rhythm.      Pulses: Normal pulses.      Heart sounds: Normal heart sounds.   Pulmonary:      Effort: Pulmonary effort is normal.      Breath sounds: Normal breath sounds.   Abdominal:      General: Abdomen is flat. Bowel sounds are normal.      Palpations: Abdomen is soft.      Comments: Left abdomen mildly swollen compared to right, left inguinal region swollen  compared to right.    Musculoskeletal:         General: Normal range of motion.      Cervical back: Normal range of motion and neck supple.   Skin:     General: Skin is warm and dry.   Neurological:      General: No focal deficit present.      Mental Status: He is alert and oriented to person, place, and time. Mental status is at baseline.   Psychiatric:         Mood and Affect: Mood normal.         Behavior: Behavior normal.         Thought Content: Thought content normal.         Judgment: Judgment normal.         Assessment:       1. Generalized abdominal pain    2. Exposure to viral disease    3. Sinusitis, unspecified chronicity, unspecified location        Plan:     Generalized abdominal pain  -     X-Ray KUB; Future; Expected date: 12/20/2023  -      Abdomen Complete; Future; Expected date: 12/20/2023    Exposure to viral disease  -     SARS Coronavirus 2 Antigen, POCT  -     POCT Influenza A/B Rapid Antigen    Sinusitis, unspecified chronicity, unspecified location  -     dexAMETHasone injection 4 mg    Other orders  -     amoxicillin-clavulanate 875-125mg (AUGMENTIN) 875-125 mg per tablet; Take 1 tablet by mouth 2 (two) times a day. for 7 days  Dispense: 14 tablet; Refill: 0  -     predniSONE (DELTASONE) 20 MG tablet; Take 1 tablet (20 mg total) by mouth once daily. for 5 days  Dispense: 5 tablet; Refill: 0  -     polyethylene glycol (GLYCOLAX) 17 gram/dose powder; 2 capfuls by mouth daily x 3 days then 1 capful daily  Dispense: 507 g; Refill: 0  -     bisacodyL (DULCOLAX) 5 mg EC tablet; Take 3 tablets (15 mg total) by mouth once. for 1 dose  Dispense: 3 tablet; Refill: 0  -     ondansetron (ZOFRAN) 4 MG tablet; Take 1 tablet (4 mg total) by mouth every 8 (eight) hours as needed for Nausea.  Dispense: 10 tablet; Refill: 0       Will treat for constipation and gas buildup, will get US. Will treat for sinusitis as well.

## 2024-01-22 ENCOUNTER — HOSPITAL ENCOUNTER (OUTPATIENT)
Dept: RADIOLOGY | Facility: HOSPITAL | Age: 54
Discharge: HOME | End: 2024-01-22
Attending: FAMILY MEDICINE
Payer: COMMERCIAL

## 2024-01-22 DIAGNOSIS — R10.84 GENERALIZED ABDOMINAL PAIN: ICD-10-CM

## 2024-01-22 PROCEDURE — 76700 US EXAM ABDOM COMPLETE: CPT | Mod: 26,,, | Performed by: RADIOLOGY

## 2024-01-22 PROCEDURE — 76700 US EXAM ABDOM COMPLETE: CPT | Mod: TC

## 2024-07-27 ENCOUNTER — OFFICE VISIT (OUTPATIENT)
Dept: FAMILY MEDICINE | Facility: CLINIC | Age: 54
End: 2024-07-27
Payer: COMMERCIAL

## 2024-07-27 VITALS
BODY MASS INDEX: 34.36 KG/M2 | HEIGHT: 70 IN | RESPIRATION RATE: 18 BRPM | SYSTOLIC BLOOD PRESSURE: 137 MMHG | OXYGEN SATURATION: 96 % | DIASTOLIC BLOOD PRESSURE: 91 MMHG | HEART RATE: 82 BPM | TEMPERATURE: 98 F | WEIGHT: 240 LBS

## 2024-07-27 DIAGNOSIS — S81.801A WOUND OF RIGHT LOWER EXTREMITY, INITIAL ENCOUNTER: Primary | ICD-10-CM

## 2024-07-27 PROBLEM — L84 CORNS AND CALLOSITIES: Status: ACTIVE | Noted: 2024-07-27

## 2024-07-27 LAB — GLUCOSE SERPL-MCNC: 88 MG/DL (ref 70–110)

## 2024-07-27 PROCEDURE — 80053 COMPREHEN METABOLIC PANEL: CPT | Mod: ,,, | Performed by: CLINICAL MEDICAL LABORATORY

## 2024-07-27 PROCEDURE — 1160F RVW MEDS BY RX/DR IN RCRD: CPT | Mod: ,,, | Performed by: NURSE PRACTITIONER

## 2024-07-27 PROCEDURE — 85025 COMPLETE CBC W/AUTO DIFF WBC: CPT | Mod: ,,, | Performed by: CLINICAL MEDICAL LABORATORY

## 2024-07-27 PROCEDURE — 3008F BODY MASS INDEX DOCD: CPT | Mod: ,,, | Performed by: NURSE PRACTITIONER

## 2024-07-27 PROCEDURE — 99213 OFFICE O/P EST LOW 20 MIN: CPT | Mod: ,,, | Performed by: NURSE PRACTITIONER

## 2024-07-27 PROCEDURE — 99051 MED SERV EVE/WKEND/HOLIDAY: CPT | Mod: ,,, | Performed by: NURSE PRACTITIONER

## 2024-07-27 PROCEDURE — 1159F MED LIST DOCD IN RCRD: CPT | Mod: ,,, | Performed by: NURSE PRACTITIONER

## 2024-07-27 PROCEDURE — 3080F DIAST BP >= 90 MM HG: CPT | Mod: ,,, | Performed by: NURSE PRACTITIONER

## 2024-07-27 PROCEDURE — 3075F SYST BP GE 130 - 139MM HG: CPT | Mod: ,,, | Performed by: NURSE PRACTITIONER

## 2024-07-27 RX ORDER — CLINDAMYCIN HYDROCHLORIDE 300 MG/1
300 CAPSULE ORAL 3 TIMES DAILY
Qty: 30 CAPSULE | Refills: 0 | Status: SHIPPED | OUTPATIENT
Start: 2024-07-27 | End: 2024-08-06

## 2024-07-27 RX ORDER — MUPIROCIN 20 MG/G
OINTMENT TOPICAL 2 TIMES DAILY
Qty: 60 G | Refills: 2 | Status: SHIPPED | OUTPATIENT
Start: 2024-07-27

## 2024-07-27 NOTE — PROGRESS NOTES
"Subjective:       Patient ID: Zachariah Aguilar is a 54 y.o. male.    Chief Complaint: Leg Pain (Pt complains of leg pain caused by an bite or rash on R leg.)    HPI  Review of Systems   Constitutional: Negative.    Respiratory: Negative.     Cardiovascular: Negative.           Reviewed family, medical, surgical, and social history.    Objective:      BP (!) 137/91 (BP Location: Left arm, Patient Position: Sitting, BP Method: X-Large (Automatic))   Pulse 82   Temp 98.1 °F (36.7 °C) (Oral)   Resp 18   Ht 5' 10" (1.778 m)   Wt 108.9 kg (240 lb)   SpO2 96%   BMI 34.44 kg/m²   Physical Exam  Vitals and nursing note reviewed.   Constitutional:       General: He is not in acute distress.     Appearance: Normal appearance. He is not ill-appearing, toxic-appearing or diaphoretic.   HENT:      Head: Normocephalic.      Mouth/Throat:      Mouth: Mucous membranes are moist.   Cardiovascular:      Rate and Rhythm: Normal rate and regular rhythm.      Heart sounds: Normal heart sounds.   Pulmonary:      Effort: Pulmonary effort is normal.      Breath sounds: Normal breath sounds.   Musculoskeletal:      Cervical back: Normal range of motion and neck supple.   Skin:     General: Skin is warm and dry.      Capillary Refill: Capillary refill takes less than 2 seconds.      Comments:  Scaly and darkened nonhealing patchy lesion to right lower leg.  See attached picture.   Normal pedal pulses   Neurological:      Mental Status: He is alert and oriented to person, place, and time.   Psychiatric:         Mood and Affect: Mood normal.         Behavior: Behavior normal.         Thought Content: Thought content normal.         Judgment: Judgment normal.             Media Information    File Link    Scan on 7/27/2024  5:15 PM by Charley Aceves FNP        Key Information    Document ID File Type Document Type Description   [id number] Image Photographs      Import Information    Attached At Date Time User Dept   Encounter Level " 7/27/2024  5:15 PM Charley Aceves FNP [facility]     Encounter    Office Visit on 7/27/24 with Charley cAeves FNP     Office Visit on 07/27/2024   Component Date Value Ref Range Status    POC Glucose 07/27/2024 88  70 - 110 MG/DL Final      Assessment:       1. Wound of right lower extremity, initial encounter        Plan:       Wound of right lower extremity, initial encounter  -     clindamycin (CLEOCIN) 300 MG capsule; Take 1 capsule (300 mg total) by mouth 3 (three) times daily. for 10 days  Dispense: 30 capsule; Refill: 0  -     Ambulatory referral/consult to [facility]; Future; Expected date: 08/03/2024  -     mupirocin (BACTROBAN) 2 % ointment; Apply topically 2 (two) times daily.  Dispense: 60 g; Refill: 2  -     CBC Auto Differential; Future; Expected date: 07/27/2024  -     Comprehensive Metabolic Panel; Future; Expected date: 07/27/2024  -     POCT glucose    Wash wound daily.  Apply mupirocin 2 to 3 times a day   Oral antibiotic clindamycin sent to the pharmacy   I have referred you to the vein clinic.  Follow up with us if they have not called you in 1 week.    I will call with lab results   Return to clinic as needed            Risks, benefits, and side effects were discussed with the patient. All questions were answered to the fullest satisfaction of the patient, and pt verbalized understanding and agreement to treatment plan. Pt was to call with any new or worsening symptoms, or present to the ER.

## 2024-07-27 NOTE — PATIENT INSTRUCTIONS
Wash wound daily.  Apply mupirocin 2 to 3 times a day   Oral antibiotic clindamycin sent to the pharmacy   I have referred you to the vein clinic.  Follow up with us if they have not called you in 1 week.    I will call with lab results   Return to clinic as needed

## 2024-07-28 LAB
ALBUMIN SERPL BCP-MCNC: 4 G/DL (ref 3.5–5)
ALBUMIN/GLOB SERPL: 1.2 {RATIO}
ALP SERPL-CCNC: 85 U/L (ref 45–115)
ALT SERPL W P-5'-P-CCNC: 40 U/L (ref 16–61)
ANION GAP SERPL CALCULATED.3IONS-SCNC: 6 MMOL/L (ref 7–16)
AST SERPL W P-5'-P-CCNC: 30 U/L (ref 15–37)
BASOPHILS # BLD AUTO: 0.05 K/UL (ref 0–0.2)
BASOPHILS NFR BLD AUTO: 0.8 % (ref 0–1)
BILIRUB SERPL-MCNC: 0.4 MG/DL (ref ?–1.2)
BUN SERPL-MCNC: 23 MG/DL (ref 7–18)
BUN/CREAT SERPL: 17 (ref 6–20)
CALCIUM SERPL-MCNC: 9.6 MG/DL (ref 8.5–10.1)
CHLORIDE SERPL-SCNC: 108 MMOL/L (ref 98–107)
CO2 SERPL-SCNC: 30 MMOL/L (ref 21–32)
CREAT SERPL-MCNC: 1.39 MG/DL (ref 0.7–1.3)
DIFFERENTIAL METHOD BLD: ABNORMAL
EGFR (NO RACE VARIABLE) (RUSH/TITUS): 60 ML/MIN/1.73M2
EOSINOPHIL # BLD AUTO: 0.04 K/UL (ref 0–0.5)
EOSINOPHIL NFR BLD AUTO: 0.7 % (ref 1–4)
ERYTHROCYTE [DISTWIDTH] IN BLOOD BY AUTOMATED COUNT: 13.5 % (ref 11.5–14.5)
GLOBULIN SER-MCNC: 3.4 G/DL (ref 2–4)
GLUCOSE SERPL-MCNC: 96 MG/DL (ref 74–106)
HCT VFR BLD AUTO: 43.9 % (ref 40–54)
HGB BLD-MCNC: 13.7 G/DL (ref 13.5–18)
IMM GRANULOCYTES # BLD AUTO: 0.01 K/UL (ref 0–0.04)
IMM GRANULOCYTES NFR BLD: 0.2 % (ref 0–0.4)
LYMPHOCYTES # BLD AUTO: 2.08 K/UL (ref 1–4.8)
LYMPHOCYTES NFR BLD AUTO: 34.6 % (ref 27–41)
MCH RBC QN AUTO: 28.4 PG (ref 27–31)
MCHC RBC AUTO-ENTMCNC: 31.2 G/DL (ref 32–36)
MCV RBC AUTO: 91.1 FL (ref 80–96)
MONOCYTES # BLD AUTO: 0.68 K/UL (ref 0–0.8)
MONOCYTES NFR BLD AUTO: 11.3 % (ref 2–6)
MPC BLD CALC-MCNC: 9.8 FL (ref 9.4–12.4)
NEUTROPHILS # BLD AUTO: 3.15 K/UL (ref 1.8–7.7)
NEUTROPHILS NFR BLD AUTO: 52.4 % (ref 53–65)
NRBC # BLD AUTO: 0 X10E3/UL
NRBC, AUTO (.00): 0 %
PLATELET # BLD AUTO: 247 K/UL (ref 150–400)
POTASSIUM SERPL-SCNC: 4.1 MMOL/L (ref 3.5–5.1)
PROT SERPL-MCNC: 7.4 G/DL (ref 6.4–8.2)
RBC # BLD AUTO: 4.82 M/UL (ref 4.6–6.2)
SODIUM SERPL-SCNC: 140 MMOL/L (ref 136–145)
WBC # BLD AUTO: 6.01 K/UL (ref 4.5–11)

## 2024-07-28 NOTE — PROGRESS NOTES
Notify mildly increased creatinine.  This needs to be checked every 6 months.  Follow with primary care provider.  Normal CBC.

## 2024-08-12 ENCOUNTER — CLINICAL SUPPORT (OUTPATIENT)
Dept: FAMILY MEDICINE | Facility: CLINIC | Age: 54
End: 2024-08-12
Payer: COMMERCIAL

## 2024-08-12 DIAGNOSIS — S81.801A WOUND OF RIGHT LOWER EXTREMITY, INITIAL ENCOUNTER: Primary | ICD-10-CM

## 2024-08-12 NOTE — PROGRESS NOTES
Identified pt name and . Informed pt of lab results and pt verified understanding. Pt also is getting set up for a PCP.

## 2024-09-16 ENCOUNTER — OFFICE VISIT (OUTPATIENT)
Dept: FAMILY MEDICINE | Facility: CLINIC | Age: 54
End: 2024-09-16
Payer: COMMERCIAL

## 2024-09-16 VITALS
WEIGHT: 248 LBS | HEIGHT: 70 IN | TEMPERATURE: 97 F | SYSTOLIC BLOOD PRESSURE: 144 MMHG | DIASTOLIC BLOOD PRESSURE: 93 MMHG | BODY MASS INDEX: 35.5 KG/M2 | HEART RATE: 86 BPM | OXYGEN SATURATION: 99 %

## 2024-09-16 DIAGNOSIS — S81.801A WOUND OF RIGHT LOWER EXTREMITY, INITIAL ENCOUNTER: ICD-10-CM

## 2024-09-16 DIAGNOSIS — M25.561 ACUTE PAIN OF RIGHT KNEE: Primary | ICD-10-CM

## 2024-09-16 PROCEDURE — 3008F BODY MASS INDEX DOCD: CPT | Mod: ,,, | Performed by: NURSE PRACTITIONER

## 2024-09-16 PROCEDURE — 99213 OFFICE O/P EST LOW 20 MIN: CPT | Mod: ,,, | Performed by: NURSE PRACTITIONER

## 2024-09-16 PROCEDURE — 3080F DIAST BP >= 90 MM HG: CPT | Mod: ,,, | Performed by: NURSE PRACTITIONER

## 2024-09-16 PROCEDURE — 3077F SYST BP >= 140 MM HG: CPT | Mod: ,,, | Performed by: NURSE PRACTITIONER

## 2024-09-16 PROCEDURE — 1160F RVW MEDS BY RX/DR IN RCRD: CPT | Mod: ,,, | Performed by: NURSE PRACTITIONER

## 2024-09-16 PROCEDURE — 1159F MED LIST DOCD IN RCRD: CPT | Mod: ,,, | Performed by: NURSE PRACTITIONER

## 2024-09-16 RX ORDER — PREDNISONE 10 MG/1
TABLET ORAL
Qty: 7 TABLET | Refills: 0 | Status: SHIPPED | OUTPATIENT
Start: 2024-09-16

## 2024-09-16 NOTE — PATIENT INSTRUCTIONS
Call [phone], the referral center to reschedule vein clinic.   I will call with xray results  Return to clinic as needed

## 2024-09-16 NOTE — PROGRESS NOTES
"Subjective:       Patient ID: Zachariah Aguilar is a 54 y.o. male.    Chief Complaint: Pain (Right knee pain and edema. Right inner ankle is hurting as well. Pt denies any injury. )    Presents to clinic as above.  Right knee started hurting yesterday. No injury. Also has chronic wound to  right inner ankle. Was seen here in July and referred to vein clinic. Missed appointment.       Pain      Review of Systems   Constitutional: Negative.    Respiratory: Negative.     Cardiovascular: Negative.    Musculoskeletal:  Positive for joint pain. Negative for falls.          Reviewed family, medical, surgical, and social history.    Objective:      BP (!) 144/93 (BP Location: Left arm, Patient Position: Sitting)   Pulse 86   Temp 97.4 °F (36.3 °C)   Ht 5' 10" (1.778 m)   Wt 112.5 kg (248 lb)   SpO2 99%   BMI 35.58 kg/m²   Physical Exam  Vitals and nursing note reviewed.   Constitutional:       General: He is not in acute distress.     Appearance: Normal appearance. He is not ill-appearing, toxic-appearing or diaphoretic.   HENT:      Head: Normocephalic.      Mouth/Throat:      Mouth: Mucous membranes are moist.   Cardiovascular:      Rate and Rhythm: Normal rate and regular rhythm.      Heart sounds: Normal heart sounds.   Pulmonary:      Effort: Pulmonary effort is normal.      Breath sounds: Normal breath sounds.   Musculoskeletal:      Cervical back: Normal range of motion and neck supple.      Right knee: Swelling, bony tenderness and crepitus present. No deformity, effusion, erythema, ecchymosis or lacerations. Decreased range of motion. No LCL laxity, MCL laxity, ACL laxity or PCL laxity. Normal alignment, normal meniscus and normal patellar mobility. Normal pulse.      Comments: Mild right knee swelling to lateral patella. No redness or warmth. No bruising. No instability. Normal distal pulses.     Chronic scaling nonhealing wound to right inner ankle. See photo.   Skin:     General: Skin is warm and dry.      " Capillary Refill: Capillary refill takes less than 2 seconds.   Neurological:      Mental Status: He is alert and oriented to person, place, and time.   Psychiatric:         Mood and Affect: Mood normal.         Behavior: Behavior normal.         Thought Content: Thought content normal.         Judgment: Judgment normal.           Media Information    File Link    Scan on 7/27/2024  5:15 PM by Charley Aceves FNP        Key Information    Document ID File Type Document Type Description   [id number] Image Photographs      Import Information    Attached At Date Time User Dept   Encounter Level 7/27/2024  5:15 PM Charley Aceves FNP [facility]     Encounter    Office Visit on 7/27/24 with Charley Aceves FNP       No visits with results within 1 Day(s) from this visit.   Latest known visit with results is:   Office Visit on 07/27/2024   Component Date Value Ref Range Status    POC Glucose 07/27/2024 88  70 - 110 MG/DL Final    Sodium 07/27/2024 140  136 - 145 mmol/L Final    Potassium 07/27/2024 4.1  3.5 - 5.1 mmol/L Final    Chloride 07/27/2024 108 (H)  98 - 107 mmol/L Final    CO2 07/27/2024 30  21 - 32 mmol/L Final    Anion Gap 07/27/2024 6 (L)  7 - 16 mmol/L Final    Glucose 07/27/2024 96  74 - 106 mg/dL Final    BUN 07/27/2024 23 (H)  7 - 18 mg/dL Final    Creatinine 07/27/2024 1.39 (H)  0.70 - 1.30 mg/dL Final    BUN/Creatinine Ratio 07/27/2024 17  6 - 20 Final    Calcium 07/27/2024 9.6  8.5 - 10.1 mg/dL Final    Total Protein 07/27/2024 7.4  6.4 - 8.2 g/dL Final    Albumin 07/27/2024 4.0  3.5 - 5.0 g/dL Final    Globulin 07/27/2024 3.4  2.0 - 4.0 g/dL Final    A/G Ratio 07/27/2024 1.2   Final    Bilirubin, Total 07/27/2024 0.4  >0.0 - 1.2 mg/dL Final    Alk Phos 07/27/2024 85  45 - 115 U/L Final    ALT 07/27/2024 40  16 - 61 U/L Final    AST 07/27/2024 30  15 - 37 U/L Final    eGFR 07/27/2024 60  >=60 mL/min/1.73m2 Final    WBC 07/27/2024 6.01  4.50 - 11.00 K/uL Final    RBC 07/27/2024 4.82  4.60 -  6.20 M/uL Final    Hemoglobin 07/27/2024 13.7  13.5 - 18.0 g/dL Final    Hematocrit 07/27/2024 43.9  40.0 - 54.0 % Final    MCV 07/27/2024 91.1  80.0 - 96.0 fL Final    MCH 07/27/2024 28.4  27.0 - 31.0 pg Final    MCHC 07/27/2024 31.2 (L)  32.0 - 36.0 g/dL Final    RDW 07/27/2024 13.5  11.5 - 14.5 % Final    Platelet Count 07/27/2024 247  150 - 400 K/uL Final    MPV 07/27/2024 9.8  9.4 - 12.4 fL Final    Neutrophils % 07/27/2024 52.4 (L)  53.0 - 65.0 % Final    Lymphocytes % 07/27/2024 34.6  27.0 - 41.0 % Final    Monocytes % 07/27/2024 11.3 (H)  2.0 - 6.0 % Final    Eosinophils % 07/27/2024 0.7 (L)  1.0 - 4.0 % Final    Basophils % 07/27/2024 0.8  0.0 - 1.0 % Final    Immature Granulocytes % 07/27/2024 0.2  0.0 - 0.4 % Final    nRBC, Auto 07/27/2024 0.0  <=0.0 % Final    Neutrophils, Abs 07/27/2024 3.15  1.80 - 7.70 K/uL Final    Lymphocytes, Absolute 07/27/2024 2.08  1.00 - 4.80 K/uL Final    Monocytes, Absolute 07/27/2024 0.68  0.00 - 0.80 K/uL Final    Eosinophils, Absolute 07/27/2024 0.04  0.00 - 0.50 K/uL Final    Basophils, Absolute 07/27/2024 0.05  0.00 - 0.20 K/uL Final    Immature Granulocytes, Absolute 07/27/2024 0.01  0.00 - 0.04 K/uL Final    nRBC, Absolute 07/27/2024 0.00  <=0.00 x10e3/uL Final    Diff Type 07/27/2024 Auto   Final      Assessment:       1. Acute pain of right knee    2. Wound of right lower extremity, initial encounter        Plan:       Acute pain of right knee  -     X-Ray Knee 1 or 2 View Right; Future; Expected date: 09/16/2024  -     predniSONE (DELTASONE) 10 MG tablet; Take 2 po daily for 2 days. Then, 1 po daily until gone.  Dispense: 7 tablet; Refill: 0    Wound of right lower extremity, initial encounter    Call [phone], the referral center to reschedule vein clinic.   I will call with xray results  Return to clinic as needed          Risks, benefits, and side effects were discussed with the patient. All questions were answered to the fullest satisfaction of the patient,  and pt verbalized understanding and agreement to treatment plan. Pt was to call with any new or worsening symptoms, or present to the ER.

## 2024-09-25 ENCOUNTER — INITIAL CONSULT (OUTPATIENT)
Dept: VASCULAR SURGERY | Facility: CLINIC | Age: 54
End: 2024-09-25
Payer: COMMERCIAL

## 2024-09-25 VITALS
BODY MASS INDEX: 40.98 KG/M2 | RESPIRATION RATE: 17 BRPM | HEART RATE: 89 BPM | SYSTOLIC BLOOD PRESSURE: 127 MMHG | HEIGHT: 65 IN | WEIGHT: 246 LBS | DIASTOLIC BLOOD PRESSURE: 89 MMHG

## 2024-09-25 DIAGNOSIS — L81.9 HYPERPIGMENTATION OF SKIN: ICD-10-CM

## 2024-09-25 DIAGNOSIS — L97.919 STASIS EDEMA WITH ULCER, RIGHT: ICD-10-CM

## 2024-09-25 DIAGNOSIS — R60.0 LOWER EXTREMITY EDEMA: ICD-10-CM

## 2024-09-25 DIAGNOSIS — M79.604 LEG PAIN, BILATERAL: Primary | ICD-10-CM

## 2024-09-25 DIAGNOSIS — I87.311 STASIS EDEMA WITH ULCER, RIGHT: ICD-10-CM

## 2024-09-25 DIAGNOSIS — M79.605 LEG PAIN, BILATERAL: Primary | ICD-10-CM

## 2024-09-25 PROCEDURE — 99999 PR PBB SHADOW E&M-EST. PATIENT-LVL IV: CPT | Mod: PBBFAC,,, | Performed by: FAMILY MEDICINE

## 2024-09-25 PROCEDURE — 99214 OFFICE O/P EST MOD 30 MIN: CPT | Mod: PBBFAC | Performed by: FAMILY MEDICINE

## 2024-09-25 PROCEDURE — 99203 OFFICE O/P NEW LOW 30 MIN: CPT | Mod: S$PBB,,, | Performed by: FAMILY MEDICINE

## 2024-09-25 NOTE — PROGRESS NOTES
VEIN CENTER CLINIC NOTE    Patient ID: Zachariah Aguilar is a 54 y.o. male.    I. HISTORY     Chief Complaint:   Chief Complaint   Patient presents with    Leg Pain     NP RF CHARLEY LARSON, RT LEG PAIN SWELLING/WOUND        HPI: Zachariah Aguilar is a 54 y.o. male who is referred here today by Charley Larson FNP for evaluation of lower extremity edema, hyperpigmentation, wound and pain.  Symptoms are progressive/worsening and began greater than 5 years ago.  Location is bilateral lower extremities below the knees. Symptoms are worse at the end of the day.  History of venous interventions includes none.  Denies family history of venous disease.  Denies history of DVT or cellulitis.      Venous Disease Medical Necessity Documentation Initial Visit Date:9/25/24 Return Check Date:    Have you ever had a rupture or bleed from a varicose vein in your leg(s)?              [] Yes  [x] No   [] Yes   [] No   Have you ever been diagnosed with phlebitis, cellulitis, or inflammation in the area of the varicose veins of  your leg(s)?  [] Yes  [x] No    [] Yes   [] No   Do you have darkened or inflamed skin on your legs?   [x] Yes   [] No   [] Yes   [] No   Do you have leg swelling?     [x] Yes   [] No   [] Yes   [] No   Do you have leg pain?   [x] Yes   [] No   [] Yes   [] No   If yes, describe the type of pain?    [x]   Stabbing []  Radiating [x]  Aching   [x]  Tightness []  Throbbing               [x]  Burning [x]  Cramping              Do you have leg discomfort?   [x] Yes   [] No   [] Yes   [] No   If yes, describe the type of discomfort?    [x]  Heaviness [x]  Fullness   [x]  Restlessness [x] Tired/Fatigued [] Itching              Have you ever worn compression hose?   [x] Yes   [] No   [] Yes   [] No   If yes, how long? 2M          Do you elevate your legs while sitting?   [x] Yes   [] No   [] Yes   [] No   Does venous disease (varicose veins, ulcers, skin changes, leg pain/swelling) interfere with your daily life?  If yes, check  activities you are limited or unable to do.    []  Shower  [x]   Walk  []  Exercise  [] Play with children/grandchildren  []  Shop [] Work [x] Stand for any period of time [x] Sleep                               [x] Sitting for an extended period of time.           [x] Yes   [] No   [] Yes   [] No   Do you exercise/have you tried to exercise (i.e.  Walk our participate in a regular exercise routine)?  [] Yes  [x] No   [] Yes   [] No   BMI 40.9             History reviewed. No pertinent past medical history.     History reviewed. No pertinent surgical history.    Social History     Tobacco Use   Smoking Status Never   Smokeless Tobacco Not on file         Current Outpatient Medications:     predniSONE (DELTASONE) 10 MG tablet, Take 2 po daily for 2 days. Then, 1 po daily until gone., Disp: 7 tablet, Rfl: 0    Review of Systems   Constitutional:  Negative for activity change, chills, diaphoresis, fatigue and fever.   Respiratory:  Negative for cough and shortness of breath.    Cardiovascular:  Positive for leg swelling. Negative for chest pain and claudication.        Hyperpigmentation LE   Gastrointestinal:  Negative for nausea and vomiting.   Musculoskeletal:  Positive for leg pain. Negative for joint swelling.   Integumentary:  Positive for wound. Negative for rash.   Neurological:  Negative for weakness and numbness.          II. PHYSICAL EXAM     Physical Exam  Constitutional:       General: He is awake. He is not in acute distress.     Appearance: Normal appearance. He is overweight. He is not ill-appearing or toxic-appearing.   HENT:      Head: Normocephalic and atraumatic.   Eyes:      Extraocular Movements: Extraocular movements intact.      Conjunctiva/sclera: Conjunctivae normal.      Pupils: Pupils are equal, round, and reactive to light.   Neck:      Vascular: No carotid bruit or JVD.   Cardiovascular:      Rate and Rhythm: Normal rate and regular rhythm.      Pulses:           Dorsalis pedis pulses are  detected w/ Doppler on the right side and detected w/ Doppler on the left side.        Posterior tibial pulses are detected w/ Doppler on the right side and detected w/ Doppler on the left side.      Heart sounds: No murmur heard.  Pulmonary:      Effort: Pulmonary effort is normal. No respiratory distress.      Breath sounds: No stridor. No wheezing, rhonchi or rales.   Musculoskeletal:         General: No swelling, tenderness or deformity.      Right lower le+ Edema present.      Left lower le+ Edema present.      Comments: No evidence of active cellulitis bilaterally.  Small stasis ulcer below the right medial malleolus.  See pictures for details.   Feet:      Comments: Triphasic hand-held dopplerable pulses of the bilateral dorsalis pedis and posterior tibial arteries.  Skin:     General: Skin is warm.      Capillary Refill: Capillary refill takes less than 2 seconds.      Coloration: Skin is not ashen.      Findings: No bruising, erythema, lesion, rash or wound.   Neurological:      Mental Status: He is alert and oriented to person, place, and time.      Motor: No weakness.   Psychiatric:         Speech: Speech normal.         Behavior: Behavior normal. Behavior is cooperative.         Reticular/Spider veins noted:  RLE: none  LLE: none    Varicose veins noted:  RLE: none  LLE:  none    CEAP Classification                     Venous Clinical Severity Score     III. ASSESSMENT & PLAN (MEDICAL DECISION MAKING)     1. Leg pain, bilateral    2. Lower extremity edema    3. Hyperpigmentation of skin    4. Stasis edema with ulcer, right        Assessment/Diagnosis and Plan:  Patient has complaints, symptoms and physical exam findings of chronic venous disease.  Therefore, I will order a bilateral complete venous reflux study and see the patient back with results.    Orders Placed This Encounter    US Venous Reflux Study Bilateral          Wolf Gooden DO

## 2024-10-23 ENCOUNTER — HOSPITAL ENCOUNTER (OUTPATIENT)
Dept: RADIOLOGY | Facility: HOSPITAL | Age: 54
Discharge: HOME | End: 2024-10-23
Attending: FAMILY MEDICINE
Payer: COMMERCIAL

## 2024-10-23 ENCOUNTER — OFFICE VISIT (OUTPATIENT)
Dept: VASCULAR SURGERY | Facility: CLINIC | Age: 54
End: 2024-10-23
Attending: FAMILY MEDICINE
Payer: COMMERCIAL

## 2024-10-23 VITALS
HEART RATE: 80 BPM | WEIGHT: 250 LBS | SYSTOLIC BLOOD PRESSURE: 134 MMHG | BODY MASS INDEX: 35.79 KG/M2 | DIASTOLIC BLOOD PRESSURE: 97 MMHG | HEIGHT: 70 IN | RESPIRATION RATE: 20 BRPM

## 2024-10-23 DIAGNOSIS — L97.919 STASIS EDEMA WITH ULCER, BILATERAL: ICD-10-CM

## 2024-10-23 DIAGNOSIS — I87.2 VENOUS INSUFFICIENCY: ICD-10-CM

## 2024-10-23 DIAGNOSIS — M79.604 LEG PAIN, BILATERAL: ICD-10-CM

## 2024-10-23 DIAGNOSIS — L81.9 HYPERPIGMENTATION OF SKIN: ICD-10-CM

## 2024-10-23 DIAGNOSIS — L97.929 STASIS EDEMA WITH ULCER, BILATERAL: ICD-10-CM

## 2024-10-23 DIAGNOSIS — M79.605 LEG PAIN, BILATERAL: ICD-10-CM

## 2024-10-23 DIAGNOSIS — R60.0 LOWER EXTREMITY EDEMA: Primary | ICD-10-CM

## 2024-10-23 DIAGNOSIS — I87.313 STASIS EDEMA WITH ULCER, BILATERAL: ICD-10-CM

## 2024-10-23 PROCEDURE — 99214 OFFICE O/P EST MOD 30 MIN: CPT | Mod: S$PBB,,, | Performed by: FAMILY MEDICINE

## 2024-10-23 PROCEDURE — 3080F DIAST BP >= 90 MM HG: CPT | Mod: ,,, | Performed by: FAMILY MEDICINE

## 2024-10-23 PROCEDURE — 3075F SYST BP GE 130 - 139MM HG: CPT | Mod: ,,, | Performed by: FAMILY MEDICINE

## 2024-10-23 PROCEDURE — 3008F BODY MASS INDEX DOCD: CPT | Mod: ,,, | Performed by: FAMILY MEDICINE

## 2024-10-23 PROCEDURE — 93970 EXTREMITY STUDY: CPT | Mod: TC

## 2024-10-23 PROCEDURE — 99999 PR PBB SHADOW E&M-EST. PATIENT-LVL IV: CPT | Mod: PBBFAC,,, | Performed by: FAMILY MEDICINE

## 2024-10-23 PROCEDURE — 93970 EXTREMITY STUDY: CPT | Mod: 26,,, | Performed by: FAMILY MEDICINE

## 2024-10-23 PROCEDURE — 99214 OFFICE O/P EST MOD 30 MIN: CPT | Mod: PBBFAC,25 | Performed by: FAMILY MEDICINE

## 2024-10-23 PROCEDURE — 1159F MED LIST DOCD IN RCRD: CPT | Mod: ,,, | Performed by: FAMILY MEDICINE

## 2024-10-23 PROCEDURE — 1160F RVW MEDS BY RX/DR IN RCRD: CPT | Mod: ,,, | Performed by: FAMILY MEDICINE

## 2024-10-23 NOTE — PATIENT INSTRUCTIONS
Pre Procedure Information    Procedure (s) and Date (s):  1) Right GSV Venaseal Ablation - 11/21/2024  2)  Left GSV Venaseal Ablation - 12/05/2024  3)  Left GSV Venaseal Ablation - 12/12/2024      We will call you the day prior with your time to report for surgery.  You will check in at [facility].  Shower prior to procedure as your leg will be wrapped for 48 hours and you will not be able to shower.  Do not wear lotion, oil, or cream on you legs on the day of your procedure.  This will cause the Doctor's jonathan to fade.  Wear shorts, skirt, or loose pants and larger shoes (house shoes).   Bring a pillow or two and leave in the car (to prop your leg).  Prepare to walk 15 minutes out of each hour for the first 48 hours post op.  Prepare to keep your legs propped up while sitting for the first 48 hours or 2 days following your procedure (recliner, couch, or chair).  Dressings will remain on your legs for at least 48 hours or 2 days after procedure.  Full discharge instructions will be provided on the day of your procedure.    Typically, you are in and out within a few hours.  We will follow you postoperatively with a 4 day post ablation ultrasound and then a 1 month, 3 month, 6 month, and 1 year post ablation visit with the physician or nurse practitioner with or without an ultrasound.  Please make every effort to attend these appointments as they will be essential to following you progress.  Please kindly notify us as soon as possible if you need to reschedule your appointment.  As always, we look forward to caring for you and are happy to answer your questions.  Please call us at [phone].

## 2024-10-23 NOTE — PROGRESS NOTES
VEIN CENTER CLINIC NOTE    Patient ID: Zachariah Aguilar is a 54 y.o. male.    I. HISTORY     Chief Complaint:   Chief Complaint   Patient presents with    Follow-up     US CECI COMP        HPI: Zachariah Aguilar is a 54 y.o. male who presents today follow-up and results to a bilateral complete venous reflux study performed today 10/23/2024 and the results were discussed with the patient.  This study shows no evidence of DVT bilaterally.  The study also shows dilation and axial reflux of the bilateral great and left small saphenous veins.  Refluxing varicosities also noted bilaterally.    Patient initially seen on 09/25/2024 by referral from Charley Aceves [facility] for evaluation of lower extremity edema, hyperpigmentation, wound and pain.  Symptoms are progressive/worsening and began greater than 5 years ago.  Location is bilateral lower extremities below the knees. Symptoms are worse at the end of the day.  History of venous interventions includes none.  Denies family history of venous disease.  Denies history of DVT or cellulitis.  The patient has been dealing with superficial venous ulcers below the bilateral medial malleoli which have been in various stages of healing over the past several months.  He has used intermittent compression to try to treat these which usually results in pain.  He continues to have edema, hyperpigmentation and pain in addition to these ulcers and feels these symptoms have become life altering would like to have the underlying condition corrected if possible.    Venous Disease Medical Necessity Documentation Initial Visit Date:9/25/24 Return Check Date:    Have you ever had a rupture or bleed from a varicose vein in your leg(s)?              [] Yes  [x] No   [] Yes   [] No   Have you ever been diagnosed with phlebitis, cellulitis, or inflammation in the area of the varicose veins of  your leg(s)?  [] Yes  [x] No    [] Yes   [] No   Do you have darkened or inflamed skin on your legs?   [x] Yes    [] No   [] Yes   [] No   Do you have leg swelling?     [x] Yes   [] No   [] Yes   [] No   Do you have leg pain?   [x] Yes   [] No   [] Yes   [] No   If yes, describe the type of pain?    [x]   Stabbing []  Radiating [x]  Aching   [x]  Tightness []  Throbbing               [x]  Burning [x]  Cramping              Do you have leg discomfort?   [x] Yes   [] No   [] Yes   [] No   If yes, describe the type of discomfort?    [x]  Heaviness [x]  Fullness   [x]  Restlessness [x] Tired/Fatigued [] Itching              Have you ever worn compression hose?   [x] Yes   [] No   [] Yes   [] No   If yes, how long? 2M          Do you elevate your legs while sitting?   [x] Yes   [] No   [] Yes   [] No   Does venous disease (varicose veins, ulcers, skin changes, leg pain/swelling) interfere with your daily life?  If yes, check activities you are limited or unable to do.    []  Shower  [x]   Walk  []  Exercise  [] Play with children/grandchildren  []  Shop [] Work [x] Stand for any period of time [x] Sleep                               [x] Sitting for an extended period of time.           [x] Yes   [] No   [] Yes   [] No   Do you exercise/have you tried to exercise (i.e.  Walk our participate in a regular exercise routine)?  [] Yes  [x] No   [] Yes   [] No   BMI 40.9             History reviewed. No pertinent past medical history.     History reviewed. No pertinent surgical history.    Social History     Tobacco Use   Smoking Status Never   Smokeless Tobacco Not on file         Current Outpatient Medications:     predniSONE (DELTASONE) 10 MG tablet, Take 2 po daily for 2 days. Then, 1 po daily until gone., Disp: 7 tablet, Rfl: 0    Review of Systems   Constitutional:  Negative for activity change, chills, diaphoresis, fatigue and fever.   Respiratory:  Negative for cough and shortness of breath.    Cardiovascular:  Positive for leg swelling. Negative for chest pain and claudication.        Hyperpigmentation LE   Gastrointestinal:   Negative for nausea and vomiting.   Musculoskeletal:  Positive for leg pain. Negative for joint swelling.   Integumentary:  Positive for wound. Negative for rash.   Neurological:  Negative for weakness and numbness.          II. PHYSICAL EXAM     Physical Exam  Constitutional:       General: He is awake. He is not in acute distress.     Appearance: Normal appearance. He is overweight. He is not ill-appearing or toxic-appearing.   HENT:      Head: Normocephalic and atraumatic.   Eyes:      Extraocular Movements: Extraocular movements intact.      Conjunctiva/sclera: Conjunctivae normal.      Pupils: Pupils are equal, round, and reactive to light.   Neck:      Vascular: No carotid bruit or JVD.   Cardiovascular:      Rate and Rhythm: Normal rate and regular rhythm.      Pulses:           Dorsalis pedis pulses are detected w/ Doppler on the right side and detected w/ Doppler on the left side.        Posterior tibial pulses are detected w/ Doppler on the right side and detected w/ Doppler on the left side.      Heart sounds: No murmur heard.  Pulmonary:      Effort: Pulmonary effort is normal. No respiratory distress.      Breath sounds: No stridor. No wheezing, rhonchi or rales.   Musculoskeletal:         General: No swelling, tenderness or deformity.      Right lower le+ Edema present.      Left lower le+ Edema present.      Comments: No evidence of active cellulitis bilaterally.  Small stasis ulcers below the bilateral medial malleolus.  See pictures for details.   Feet:      Comments: Triphasic hand-held dopplerable pulses of the bilateral dorsalis pedis and posterior tibial arteries.  Skin:     General: Skin is warm.      Capillary Refill: Capillary refill takes less than 2 seconds.      Coloration: Skin is not ashen.      Findings: No bruising, erythema, lesion, rash or wound.   Neurological:      Mental Status: He is alert and oriented to person, place, and time.      Motor: No weakness.   Psychiatric:          Speech: Speech normal.         Behavior: Behavior normal. Behavior is cooperative.         Reticular/Spider veins noted:  RLE: none  LLE: none    Varicose veins noted:  RLE: none  LLE:  none    CEAP Classification  Clinical Signs: Class 6 - Leg ulceration, skin changes as defined above  Etiologic Classification: Primary  Anatomic distribution: Superficial  Pathophysiologic dysfunction: Reflux                       Venous Clinical Severity Score  Pain:2=Daily, moderate activity limitation, occasional analgesics  Varicose Veins: 0=None  Venous Edema: 2=Afternoon edema, above ankle  Pigmentation: 1=Diffuse, but limited in area and old (brown)  Inflammation: 1=Mild cellulitis, limited to marginal area around ulcer  Induration: 1=Focal, circummalleolar (< 5 cm)  Number of Active Ulcers: 2=2  Active Ulceration, Duration: 1=<3 months  Active Ulcer Size: 1=<2 cm diameter  Compressive Therapy: 1=Intermittent use of stockings  Total Score: 12       III. ASSESSMENT & PLAN (MEDICAL DECISION MAKING)     1. Lower extremity edema    2. Leg pain, bilateral    3. Hyperpigmentation of skin    4. Venous insufficiency    5. Stasis edema with ulcer, bilateral          Assessment/Diagnosis and Plan:  The patient continues to have sequela of chronic venous insufficiency despite over 3 months of conservative therapy. The patient continues to have life altering symptoms as well as a positive reflux study as noted in the history of present illness above. At this time I believe it would be reasonable to proceed with bilateral great and left small saphenous vein non thermal adhesive endovenous ablations for symptomatic venous insufficiency.  Untreated venous disease increases the patient's risk for cellulitis, deep vein thrombosis, chronic skin changes and venous ulceration.  Risk and benefits of the procedure were explained to the patient and the patient wishes to proceed. The patient does not have overly distended veins and denies  history of DVT/PE and will not require prophylactic anticoagulation.          Wolf Gooden, DO

## 2024-10-24 DIAGNOSIS — I87.311 STASIS EDEMA WITH ULCER, RIGHT: Primary | ICD-10-CM

## 2024-10-24 DIAGNOSIS — L81.9 HYPERPIGMENTATION OF SKIN: ICD-10-CM

## 2024-10-24 DIAGNOSIS — M79.605 LEG PAIN, BILATERAL: ICD-10-CM

## 2024-10-24 DIAGNOSIS — L97.919 STASIS EDEMA WITH ULCER, RIGHT: Primary | ICD-10-CM

## 2024-10-24 DIAGNOSIS — M79.604 LEG PAIN, BILATERAL: ICD-10-CM

## 2024-10-24 DIAGNOSIS — R60.0 LOWER EXTREMITY EDEMA: ICD-10-CM

## 2024-10-24 PROBLEM — L97.929 STASIS EDEMA WITH ULCER, BILATERAL: Status: ACTIVE | Noted: 2024-10-24

## 2024-10-24 PROBLEM — I87.313 STASIS EDEMA WITH ULCER, BILATERAL: Status: ACTIVE | Noted: 2024-10-24

## 2024-10-24 PROBLEM — I87.2 VENOUS INSUFFICIENCY: Status: ACTIVE | Noted: 2024-10-24

## 2024-11-21 ENCOUNTER — PROCEDURE VISIT (OUTPATIENT)
Dept: VASCULAR SURGERY | Facility: CLINIC | Age: 54
End: 2024-11-21
Payer: COMMERCIAL

## 2024-11-21 VITALS
BODY MASS INDEX: 34.82 KG/M2 | HEART RATE: 73 BPM | DIASTOLIC BLOOD PRESSURE: 107 MMHG | RESPIRATION RATE: 14 BRPM | SYSTOLIC BLOOD PRESSURE: 149 MMHG | WEIGHT: 243.19 LBS | HEIGHT: 70 IN

## 2024-11-21 DIAGNOSIS — L97.919 STASIS EDEMA WITH ULCER, RIGHT: ICD-10-CM

## 2024-11-21 DIAGNOSIS — M79.604 LEG PAIN, BILATERAL: ICD-10-CM

## 2024-11-21 DIAGNOSIS — L81.9 HYPERPIGMENTATION OF SKIN: ICD-10-CM

## 2024-11-21 DIAGNOSIS — I87.311 STASIS EDEMA WITH ULCER, RIGHT: ICD-10-CM

## 2024-11-21 DIAGNOSIS — R60.0 LOWER EXTREMITY EDEMA: ICD-10-CM

## 2024-11-21 DIAGNOSIS — I87.2 VENOUS INSUFFICIENCY: Primary | ICD-10-CM

## 2024-11-21 DIAGNOSIS — M79.605 LEG PAIN, BILATERAL: ICD-10-CM

## 2024-11-21 PROCEDURE — 99999PBSHW PR PBB SHADOW TECHNICAL ONLY FILED TO HB: Mod: PBBFAC,,,

## 2024-11-21 PROCEDURE — 36482 ENDOVEN THER CHEM ADHES 1ST: CPT | Mod: PBBFAC | Performed by: FAMILY MEDICINE

## 2024-11-21 PROCEDURE — C1888 ENDOVAS NON-CARDIAC ABL CATH: HCPCS

## 2024-11-21 PROCEDURE — 27000272 HC BANDAGE KERLIX

## 2024-11-21 PROCEDURE — 27000932 HC BANDAGE ELAST SELF CLOSURE

## 2024-11-21 PROCEDURE — 36482 ENDOVEN THER CHEM ADHES 1ST: CPT | Mod: S$PBB,RT,, | Performed by: FAMILY MEDICINE

## 2024-11-21 PROCEDURE — 96372 THER/PROPH/DIAG INJ SC/IM: CPT | Mod: PBBFAC | Performed by: FAMILY MEDICINE

## 2024-11-21 RX ORDER — LIDOCAINE HYDROCHLORIDE 10 MG/ML
2 INJECTION, SOLUTION INFILTRATION; PERINEURAL
Status: COMPLETED | OUTPATIENT
Start: 2024-11-21 | End: 2024-11-21

## 2024-11-21 RX ADMIN — LIDOCAINE HYDROCHLORIDE 2 ML: 10 INJECTION, SOLUTION INFILTRATION; PERINEURAL at 09:11

## 2024-11-21 NOTE — LETTER
November 21, 2024      [facility]  [address]  [place] [address]  Phone: [phone]  Fax: [phone]       Patient: Zachariah Aguilar   YOB: 1970  Date of Visit: 11/21/2024    To Whom It May Concern:    Ilya Aguilar  was at [facility] on 11/21/2024. The patient may return to work/school on 11/25/2024 with restrictions, do not lift greater than 20 lbs for 2 weeks (12/05/2024).  If you have any questions or concerns, or if I can be of further assistance, please do not hesitate to contact me.    Sincerely,    Amisha Dove RN

## 2024-11-21 NOTE — LETTER
November 21, 2024      [facility]  [address]  [place] [address]  Phone: [phone]  Fax: [phone]       Patient: Zachariah Aguilar   YOB: 1970  Date of Visit: 11/21/2024    To Whom It May Concern:    Ilya Aguilar  was at [facility] on 11/21/2024. The patient may return to work/school on 11/26/2024 with restrictions, do not lift greater than 20lbs for 2 weeks (112/05/2024). If you have any questions or concerns, or if I can be of further assistance, please do not hesitate to contact me.    Sincerely,    Amisha Dove RN

## 2024-11-21 NOTE — PROCEDURES
Date: 11/21/24   Surgeon: Dr Rahul Gooden DO    Procedure: Endovenous Adhesive Ablation of the right Greater Saphenous Vein     Estimated blood loss: 3 cc.  Specimens removed:  None.  Complications:  None.  Implants or grafts:  None.    Findings: Treatment Length  47 (cm):  Maximum diameter of the vein treated measures 6.0 mm with a maximum reflux time of 3.9 seconds.        Pre-Procedure: Patient's vital signs and informed consent were obtained. Patient history was checked and updated with any changes since the last visit. The patient continues to present with symptoms of venous disease as proven via DUS Venous Insufficiency exam completed prior to procedure. A complete Time Out was performed; with all parties present, which verified patient's identification, intended procedure, correct procedure site, and all equipment/supplies needed to complete the procedure.     Procedure: Ultrasound guidance was used to jonathan the target vein with the patient positioned on the treatment table. The entire lower extremity was prepped with a 50/50 % solution of isopropyl alcohol and chlorhexidine; then sterile drapes were applied. Once the desired access point was identified; less than 5mL of 0.5% Lidocaine buffered with Sodium Bicarbonate was distributed, to comfortably gain access in real-time with ultrasound guidance. A guidewire was used as necessary, which allowed insertion of the blue introduction catheter. Once positioned, the delivery catheter was prepped and inserted into the introducer sheath. The delivery catheter position was confirmed via ultrasound 5cm distal to Saphenofemoral junction. Following the IFU, adhesive was delivered, segmentally, into the target vein. Ultrasound visualization confirmed successful treatment of the targeted vein with no evidence of complications at the junction. All devices were then removed, and hemostasis was achieved with a suture. Dressings with compression were applied to the  access site and to any puncture sites requiring them. There was minimal blood loss.

## 2024-11-25 ENCOUNTER — OFFICE VISIT (OUTPATIENT)
Dept: VASCULAR SURGERY | Facility: CLINIC | Age: 54
End: 2024-11-25
Payer: COMMERCIAL

## 2024-11-25 ENCOUNTER — HOSPITAL ENCOUNTER (OUTPATIENT)
Dept: RADIOLOGY | Facility: HOSPITAL | Age: 54
Discharge: HOME | End: 2024-11-25
Attending: FAMILY MEDICINE
Payer: COMMERCIAL

## 2024-11-25 VITALS
DIASTOLIC BLOOD PRESSURE: 98 MMHG | SYSTOLIC BLOOD PRESSURE: 144 MMHG | HEART RATE: 86 BPM | HEIGHT: 70 IN | RESPIRATION RATE: 16 BRPM | WEIGHT: 243.19 LBS | BODY MASS INDEX: 34.82 KG/M2

## 2024-11-25 DIAGNOSIS — I87.2 VENOUS INSUFFICIENCY: ICD-10-CM

## 2024-11-25 DIAGNOSIS — L97.919 STASIS EDEMA WITH ULCER, RIGHT: ICD-10-CM

## 2024-11-25 DIAGNOSIS — L81.9 HYPERPIGMENTATION OF SKIN: ICD-10-CM

## 2024-11-25 DIAGNOSIS — M79.605 LEG PAIN, BILATERAL: ICD-10-CM

## 2024-11-25 DIAGNOSIS — I87.311 STASIS EDEMA WITH ULCER, RIGHT: ICD-10-CM

## 2024-11-25 DIAGNOSIS — R60.0 EDEMA, LOWER EXTREMITY: Primary | ICD-10-CM

## 2024-11-25 DIAGNOSIS — M79.604 LEG PAIN, BILATERAL: ICD-10-CM

## 2024-11-25 PROCEDURE — 3077F SYST BP >= 140 MM HG: CPT | Mod: ,,, | Performed by: FAMILY MEDICINE

## 2024-11-25 PROCEDURE — 93971 EXTREMITY STUDY: CPT | Mod: 26,RT,, | Performed by: FAMILY MEDICINE

## 2024-11-25 PROCEDURE — 1159F MED LIST DOCD IN RCRD: CPT | Mod: ,,, | Performed by: FAMILY MEDICINE

## 2024-11-25 PROCEDURE — 99214 OFFICE O/P EST MOD 30 MIN: CPT | Mod: S$PBB,,, | Performed by: FAMILY MEDICINE

## 2024-11-25 PROCEDURE — 3008F BODY MASS INDEX DOCD: CPT | Mod: ,,, | Performed by: FAMILY MEDICINE

## 2024-11-25 PROCEDURE — 93971 EXTREMITY STUDY: CPT | Mod: TC,RT

## 2024-11-25 PROCEDURE — 1160F RVW MEDS BY RX/DR IN RCRD: CPT | Mod: ,,, | Performed by: FAMILY MEDICINE

## 2024-11-25 PROCEDURE — 99213 OFFICE O/P EST LOW 20 MIN: CPT | Mod: PBBFAC,25 | Performed by: FAMILY MEDICINE

## 2024-11-25 PROCEDURE — 99999 PR PBB SHADOW E&M-EST. PATIENT-LVL III: CPT | Mod: PBBFAC,,, | Performed by: FAMILY MEDICINE

## 2024-11-25 PROCEDURE — 3080F DIAST BP >= 90 MM HG: CPT | Mod: ,,, | Performed by: FAMILY MEDICINE

## 2024-11-25 NOTE — PROGRESS NOTES
VEIN CENTER CLINIC NOTE    Patient ID: Zachariah Aguilar is a 54 y.o. male.    I. HISTORY     Chief Complaint:   Chief Complaint   Patient presents with    Follow-up     Exam room 2.  4D s/p RT GSV VenaSeal Ablation        HPI: Zachariah Aguilar is a 54 y.o. male who presents today 4 days post right great saphenous vein non thermal adhesive ablation.  The patient states she did well over the weekend without complications.  He states that he maybe able to tell a little bit of difference with less swelling of the right lower extremity.  Otherwise he can not tell much difference yet.  Post ablation ultrasound today shows a well ablated right great saphenous vein without evidence of chemical induced thrombus.  He continues with postoperative compression as well.  Overall, he is doing well.    Clinical summary:  Patient initially seen on 09/25/2024 by referral from [facility] for evaluation of lower extremity edema, hyperpigmentation, wound and pain.  Symptoms are progressive/worsening and began greater than 5 years ago.  Location is bilateral lower extremities below the knees. Symptoms are worse at the end of the day.  History of venous interventions includes none.  Denies family history of venous disease.  Denies history of DVT or cellulitis.  The patient has been dealing with superficial venous ulcers below the bilateral medial malleoli which have been in various stages of healing over the past several months.  He has used intermittent compression to try to treat these which usually results in pain.  He continues to have edema, hyperpigmentation and pain in addition to these ulcers and feels these symptoms have become life altering would like to have the underlying condition corrected if possible.    Bilateral complete venous reflux study performed 10/23/2024 shows no evidence of DVT bilaterally.  The study also shows dilation and axial reflux of the bilateral great and left small saphenous veins.  Refluxing  varicosities also noted bilaterally.    Venous Disease Medical Necessity Documentation Initial Visit Date:9/25/24 Return Check Date:    Have you ever had a rupture or bleed from a varicose vein in your leg(s)?              [] Yes  [x] No   [] Yes   [] No   Have you ever been diagnosed with phlebitis, cellulitis, or inflammation in the area of the varicose veins of  your leg(s)?  [] Yes  [x] No    [] Yes   [] No   Do you have darkened or inflamed skin on your legs?   [x] Yes   [] No   [] Yes   [] No   Do you have leg swelling?     [x] Yes   [] No   [] Yes   [] No   Do you have leg pain?   [x] Yes   [] No   [] Yes   [] No   If yes, describe the type of pain?    [x]   Stabbing []  Radiating [x]  Aching   [x]  Tightness []  Throbbing               [x]  Burning [x]  Cramping              Do you have leg discomfort?   [x] Yes   [] No   [] Yes   [] No   If yes, describe the type of discomfort?    [x]  Heaviness [x]  Fullness   [x]  Restlessness [x] Tired/Fatigued [] Itching              Have you ever worn compression hose?   [x] Yes   [] No   [] Yes   [] No   If yes, how long? 2M          Do you elevate your legs while sitting?   [x] Yes   [] No   [] Yes   [] No   Does venous disease (varicose veins, ulcers, skin changes, leg pain/swelling) interfere with your daily life?  If yes, check activities you are limited or unable to do.    []  Shower  [x]   Walk  []  Exercise  [] Play with children/grandchildren  []  Shop [] Work [x] Stand for any period of time [x] Sleep                               [x] Sitting for an extended period of time.           [x] Yes   [] No   [] Yes   [] No   Do you exercise/have you tried to exercise (i.e.  Walk our participate in a regular exercise routine)?  [] Yes  [x] No   [] Yes   [] No   BMI 40.9             History reviewed. No pertinent past medical history.     Past Surgical History:   Procedure Laterality Date    ABLATION, CHEMICAL SEALANT, VARICOSE VEIN Right 11/21/2024    Right GSV  Venaseal Ablation performed by Dr. Rahul Gooden       Social History     Tobacco Use   Smoking Status Never   Smokeless Tobacco Not on file         Current Outpatient Medications:     predniSONE (DELTASONE) 10 MG tablet, Take 2 po daily for 2 days. Then, 1 po daily until gone. (Patient not taking: Reported on 2024), Disp: 7 tablet, Rfl: 0    Review of Systems   Constitutional:  Negative for activity change, chills, diaphoresis, fatigue and fever.   Respiratory:  Negative for cough and shortness of breath.    Cardiovascular:  Positive for leg swelling. Negative for chest pain and claudication.        Hyperpigmentation LE   Gastrointestinal:  Negative for nausea and vomiting.   Musculoskeletal:  Positive for leg pain. Negative for joint swelling.   Integumentary:  Positive for wound. Negative for rash.   Neurological:  Negative for weakness and numbness.          II. PHYSICAL EXAM     Physical Exam  Constitutional:       General: He is awake. He is not in acute distress.     Appearance: Normal appearance. He is overweight. He is not ill-appearing or toxic-appearing.   HENT:      Head: Normocephalic and atraumatic.   Eyes:      Extraocular Movements: Extraocular movements intact.      Conjunctiva/sclera: Conjunctivae normal.      Pupils: Pupils are equal, round, and reactive to light.   Neck:      Vascular: No carotid bruit or JVD.   Cardiovascular:      Rate and Rhythm: Normal rate and regular rhythm.      Pulses:           Dorsalis pedis pulses are detected w/ Doppler on the right side and detected w/ Doppler on the left side.        Posterior tibial pulses are detected w/ Doppler on the right side and detected w/ Doppler on the left side.      Heart sounds: No murmur heard.  Pulmonary:      Effort: Pulmonary effort is normal. No respiratory distress.      Breath sounds: No stridor. No wheezing, rhonchi or rales.   Musculoskeletal:         General: No swelling, tenderness or deformity.      Right lower le+  Edema present.      Left lower le+ Edema present.      Comments: No evidence of active cellulitis bilaterally.  Small stasis ulcers below the bilateral medial malleolus.  See pictures for details.   Feet:      Comments: Triphasic hand-held dopplerable pulses of the bilateral dorsalis pedis and posterior tibial arteries.  Skin:     General: Skin is warm.      Capillary Refill: Capillary refill takes less than 2 seconds.      Coloration: Skin is not ashen.      Findings: No bruising, erythema, lesion, rash or wound.   Neurological:      Mental Status: He is alert and oriented to person, place, and time.      Motor: No weakness.   Psychiatric:         Speech: Speech normal.         Behavior: Behavior normal. Behavior is cooperative.         Reticular/Spider veins noted:  RLE: none  LLE: none    Varicose veins noted:  RLE: none  LLE:  none    CEAP Classification  Clinical Signs: Class 6 - Leg ulceration, skin changes as defined above  Etiologic Classification: Primary  Anatomic distribution: Superficial  Pathophysiologic dysfunction: Reflux                       Venous Clinical Severity Score  Pain:2=Daily, moderate activity limitation, occasional analgesics  Varicose Veins: 0=None  Venous Edema: 2=Afternoon edema, above ankle  Pigmentation: 1=Diffuse, but limited in area and old (brown)  Inflammation: 1=Mild cellulitis, limited to marginal area around ulcer  Induration: 1=Focal, circummalleolar (< 5 cm)  Number of Active Ulcers: 2=2  Active Ulceration, Duration: 1=<3 months  Active Ulcer Size: 1=<2 cm diameter  Compressive Therapy: 1=Intermittent use of stockings  Total Score: 12       III. ASSESSMENT & PLAN (MEDICAL DECISION MAKING)     1. Edema, lower extremity    2. Hyperpigmentation of skin    3. Venous insufficiency    4. Leg pain, bilateral          Assessment/Diagnosis and Plan:  The patient continues to have sequela of chronic venous insufficiency despite over 3 months of conservative therapy. The patient  continues to have life altering symptoms as well as a positive reflux study as noted in the history of present illness above. At this time I believe it would be reasonable to proceed with a left great and left small saphenous vein non thermal adhesive endovenous ablations for symptomatic venous insufficiency.  Untreated venous disease increases the patient's risk for cellulitis, deep vein thrombosis, chronic skin changes and venous ulceration.  Risk and benefits of the procedure were explained to the patient and the patient wishes to proceed. The patient does not have overly distended veins and denies history of DVT/PE and will not require prophylactic anticoagulation.    Continue thigh-high compression postoperatively for the next 2 weeks.          Wolf Gooden, DO

## 2024-11-27 ENCOUNTER — PATIENT MESSAGE (OUTPATIENT)
Dept: RESEARCH | Facility: HOSPITAL | Age: 54
End: 2024-11-27

## 2024-12-05 ENCOUNTER — PROCEDURE VISIT (OUTPATIENT)
Dept: VASCULAR SURGERY | Facility: CLINIC | Age: 54
End: 2024-12-05
Payer: COMMERCIAL

## 2024-12-05 VITALS
BODY MASS INDEX: 35.1 KG/M2 | SYSTOLIC BLOOD PRESSURE: 126 MMHG | DIASTOLIC BLOOD PRESSURE: 80 MMHG | HEART RATE: 85 BPM | RESPIRATION RATE: 16 BRPM | HEIGHT: 70 IN | WEIGHT: 245.19 LBS

## 2024-12-05 DIAGNOSIS — L97.919 STASIS EDEMA WITH ULCER, RIGHT: ICD-10-CM

## 2024-12-05 DIAGNOSIS — I87.311 STASIS EDEMA WITH ULCER, RIGHT: ICD-10-CM

## 2024-12-05 DIAGNOSIS — M79.605 LEG PAIN, BILATERAL: ICD-10-CM

## 2024-12-05 DIAGNOSIS — I87.2 VENOUS INSUFFICIENCY: Primary | ICD-10-CM

## 2024-12-05 DIAGNOSIS — R60.0 LOWER EXTREMITY EDEMA: ICD-10-CM

## 2024-12-05 DIAGNOSIS — L81.9 HYPERPIGMENTATION OF SKIN: ICD-10-CM

## 2024-12-05 DIAGNOSIS — M79.604 LEG PAIN, BILATERAL: ICD-10-CM

## 2024-12-05 PROCEDURE — 96372 THER/PROPH/DIAG INJ SC/IM: CPT | Mod: PBBFAC | Performed by: FAMILY MEDICINE

## 2024-12-05 PROCEDURE — C1888 ENDOVAS NON-CARDIAC ABL CATH: HCPCS

## 2024-12-05 PROCEDURE — 27000932 HC BANDAGE ELAST SELF CLOSURE

## 2024-12-05 PROCEDURE — 36482 ENDOVEN THER CHEM ADHES 1ST: CPT | Mod: PBBFAC | Performed by: FAMILY MEDICINE

## 2024-12-05 PROCEDURE — 99215 OFFICE O/P EST HI 40 MIN: CPT | Mod: S$PBB,25,, | Performed by: FAMILY MEDICINE

## 2024-12-05 PROCEDURE — 27000272 HC BANDAGE KERLIX

## 2024-12-05 PROCEDURE — 36482 ENDOVEN THER CHEM ADHES 1ST: CPT | Mod: S$PBB,LT,, | Performed by: FAMILY MEDICINE

## 2024-12-05 PROCEDURE — 99999PBSHW PR PBB SHADOW TECHNICAL ONLY FILED TO HB: Mod: PBBFAC,,,

## 2024-12-05 RX ORDER — LIDOCAINE HYDROCHLORIDE 10 MG/ML
1.5 INJECTION, SOLUTION INFILTRATION; PERINEURAL ONCE
Status: COMPLETED | OUTPATIENT
Start: 2024-12-05 | End: 2024-12-05

## 2024-12-05 RX ADMIN — LIDOCAINE HYDROCHLORIDE 1.5 ML: 10 INJECTION, SOLUTION INFILTRATION; PERINEURAL at 09:12

## 2024-12-05 NOTE — PATIENT INSTRUCTIONS
Pre Procedure Information    Procedure (s) and Date (s): 1. Left SSV VenaSeal Ablation - 12/12/2024 @ 8:30 a.m.      We will call you the day prior with your time to report for surgery.  You will check in at [facility].  Shower prior to procedure as your leg will be wrapped for 48 hours and you will not be able to shower.  Do not wear lotion, oil, or cream on you legs on the day of your procedure.  This will cause the Doctor's jonathan to fade.  Wear shorts or loose pants and larger shoes (house shoes).   Bring a pillow or two and leave in the car (to prop your leg) if you have a designated   Prepare to walk 15 minutes out of each hour for the first 48 hours post op.  Prepare to keep your legs propped up while sitting for the first 48 hours or 2 days following your procedure (recliner, couch, or chair).  Dressings will remain on your legs for at least 48 hours or 2 days after procedure.  Full discharge instructions will be provided on the day of your procedure.    Typically, you are in and out within a few hours.  We will follow you postoperatively with a 4 day post ablation ultrasound and then a 1 month, 3 month, 6 month, and 1 year post ablation visit with the physician or nurse practitioner with or without an ultrasound.  Please make every effort to attend these appointments as they will be essential to following you progress.  Please kindly notify us as soon as possible if you need to reschedule your appointment.  As always, we look forward to caring for you and are happy to answer your questions.  Please call us at [phone].     Vein Procedure Discharge Instructions and post op appointment: Thursday 12/12/2024 @ 8:30 a.m.    For the first 48 hours (2 days) following your procedure:  Walk 15 minutes on every hour (while awake).  Keep leg propped up (recliner, couch, etc.) while sitting.  Take pain medication, if prescribed.  Take Ibuprofen, or Acetaminophen, for the next 2-3 days with an  over-the-counter anti-acid (Prilosec, Protonix, Nexium, etc.)    After your 48 hours (2 days) Post Op period complete:  Remove dressings, throw away all except the ACE wrap, keep the ACE wrap.  Shower using warm soapy water.  Use separate wash cloth on the treated leg.  No baths for 2 weeks.  Compression up to groin must be worn daily for 2 weeks.  You may use the ACE wrap for entire leg, or compression hose to knees and ACE wrap to groin.      Activity:  If you have a normal ultrasound one week after your ablation:  You may resume walking activities immediately.  You may resume jogging 2 weeks after your procedure.  You may resume swimming 2 weeks after your procedure.  If you had microphlebectomies, you must make sure all areas are completely healed before swimming.  You may resume lower body weight lifting 2 weeks after your procedure.  You may resume upper body weight lifting while sitting down 2 days after your procedure.  You may resume sexual activities 2 weeks after your procedure.  You may fly commercially 2 weeks after your procedure.  You may scuba dive 1 month after your procedure.    Pilots - You may not fly for 1 month after your procedure.  You must have a normal 1 month post op ultrasound before returning to flight status.

## 2024-12-05 NOTE — LETTER
December 5, 2024      [facility]  [address]  [place] [address]  Phone: [phone]  Fax: [phone]       Patient: Zachariah Aguilar   YOB: 1970  Date of Visit: 12/05/2024    To Whom It May Concern:    Ilya Aguilar  was at [facility] on 12/05/2024. The patient may return to work/school on 12/09/2024 with no restrictions. If you have any questions or concerns, or if I can be of further assistance, please do not hesitate to contact me.    Sincerely,    Sugar Beckman LPN

## 2024-12-05 NOTE — PROCEDURES
Date: 12/5/24   Surgeon: Dr Rahul Gooden DO    Procedure: Endovenous Adhesive Ablation of the left Greater Saphenous Vein     Estimated blood loss: 3 cc.  Specimens removed:  None.  Complications:  None.  Implants or grafts:  None.    Findings: Treatment Length  60 (cm):  Maximum diameter of the vein treated 5.7 mm with a maximum reflux time of 1.1 seconds.    Pre-Procedure: Patient's vital signs and informed consent were obtained. Patient history was checked and updated with any changes since the last visit. The patient continues to present with symptoms of venous disease as proven via DUS Venous Insufficiency exam completed prior to procedure. A complete Time Out was performed; with all parties present, which verified patient's identification, intended procedure, correct procedure site, and all equipment/supplies needed to complete the procedure.     Procedure: Ultrasound guidance was used to jonathan the target vein with the patient positioned on the treatment table. The entire lower extremity was prepped with a 50/50 % solution of isopropyl alcohol and chlorhexidine; then sterile drapes were applied. Once the desired access point was identified; less than 5mL of 0.5% Lidocaine buffered with Sodium Bicarbonate was distributed, to comfortably gain access in real-time with ultrasound guidance. A guidewire was used as necessary, which allowed insertion of the blue introduction catheter. Once positioned, the delivery catheter was prepped and inserted into the introducer sheath. The delivery catheter position was confirmed via ultrasound 5cm distal to Saphenofemoral junction. Following the IFU, adhesive was delivered, segmentally, into the target vein. Ultrasound visualization confirmed successful treatment of the targeted vein with no evidence of complications at the junction. All devices were then removed, and hemostasis was achieved with a suture. Dressings with compression were applied to the access site and to any  puncture sites requiring them. There was minimal blood loss.

## 2024-12-12 ENCOUNTER — HOSPITAL ENCOUNTER (OUTPATIENT)
Dept: RADIOLOGY | Facility: HOSPITAL | Age: 54
Discharge: HOME | End: 2024-12-12
Attending: FAMILY MEDICINE
Payer: COMMERCIAL

## 2024-12-12 ENCOUNTER — PROCEDURE VISIT (OUTPATIENT)
Dept: VASCULAR SURGERY | Facility: CLINIC | Age: 54
End: 2024-12-12
Payer: COMMERCIAL

## 2024-12-12 VITALS
HEIGHT: 70 IN | DIASTOLIC BLOOD PRESSURE: 90 MMHG | WEIGHT: 244.63 LBS | HEART RATE: 82 BPM | SYSTOLIC BLOOD PRESSURE: 134 MMHG | RESPIRATION RATE: 16 BRPM | BODY MASS INDEX: 35.02 KG/M2

## 2024-12-12 DIAGNOSIS — M79.604 LEG PAIN, BILATERAL: ICD-10-CM

## 2024-12-12 DIAGNOSIS — L97.919 STASIS EDEMA WITH ULCER, RIGHT: ICD-10-CM

## 2024-12-12 DIAGNOSIS — I87.2 VENOUS INSUFFICIENCY: Primary | ICD-10-CM

## 2024-12-12 DIAGNOSIS — L81.9 HYPERPIGMENTATION OF SKIN: ICD-10-CM

## 2024-12-12 DIAGNOSIS — R60.0 LOWER EXTREMITY EDEMA: ICD-10-CM

## 2024-12-12 DIAGNOSIS — I87.311 STASIS EDEMA WITH ULCER, RIGHT: ICD-10-CM

## 2024-12-12 DIAGNOSIS — M79.605 LEG PAIN, BILATERAL: ICD-10-CM

## 2024-12-12 DIAGNOSIS — I87.2 VENOUS INSUFFICIENCY: ICD-10-CM

## 2024-12-12 PROCEDURE — 27000272 HC BANDAGE KERLIX

## 2024-12-12 PROCEDURE — 99999PBSHW PR PBB SHADOW TECHNICAL ONLY FILED TO HB: Mod: PBBFAC,,,

## 2024-12-12 PROCEDURE — 96372 THER/PROPH/DIAG INJ SC/IM: CPT | Mod: PBBFAC | Performed by: FAMILY MEDICINE

## 2024-12-12 PROCEDURE — 36482 ENDOVEN THER CHEM ADHES 1ST: CPT | Mod: S$PBB,LT,, | Performed by: FAMILY MEDICINE

## 2024-12-12 PROCEDURE — 36482 ENDOVEN THER CHEM ADHES 1ST: CPT | Mod: PBBFAC | Performed by: FAMILY MEDICINE

## 2024-12-12 PROCEDURE — 27000932 HC BANDAGE ELAST SELF CLOSURE

## 2024-12-12 PROCEDURE — 99499 UNLISTED E&M SERVICE: CPT | Mod: ,,, | Performed by: FAMILY MEDICINE

## 2024-12-12 PROCEDURE — C1888 ENDOVAS NON-CARDIAC ABL CATH: HCPCS

## 2024-12-12 PROCEDURE — 93971 EXTREMITY STUDY: CPT | Mod: TC,LT

## 2024-12-12 RX ORDER — LIDOCAINE HYDROCHLORIDE 10 MG/ML
1.5 INJECTION, SOLUTION INFILTRATION; PERINEURAL ONCE
Status: COMPLETED | OUTPATIENT
Start: 2024-12-12 | End: 2024-12-12

## 2024-12-12 RX ADMIN — LIDOCAINE HYDROCHLORIDE 1.5 ML: 10 INJECTION, SOLUTION INFILTRATION; PERINEURAL at 09:12

## 2024-12-12 NOTE — PROCEDURES
Date: 12/12/24   Surgeon: Dr Rahul Gooden DO    Procedure: Endovenous Adhesive Ablation of the left small Saphenous Vein     Estimated blood loss: 3 cc.  Specimens removed:  None.  Complications:  None.  Implants or grafts:  None.    Findings: Treatment Length  29 (cm):  Maximum diameter of the vein treated 4.2 mm with a maximum reflux time of 2.9 seconds.      Pre-Procedure: Patient's vital signs and informed consent were obtained. Patient history was checked and updated with any changes since the last visit. The patient continues to present with symptoms of venous disease as proven via DUS Venous Insufficiency exam completed prior to procedure. A complete Time Out was performed; with all parties present, which verified patient's identification, intended procedure, correct procedure site, and all equipment/supplies needed to complete the procedure.     Procedure: Ultrasound guidance was used to jonathan the target vein with the patient positioned on the treatment table. The entire lower extremity was prepped with a 50/50 % solution of isopropyl alcohol and chlorhexidine; then sterile drapes were applied. Once the desired access point was identified; less than 5mL of 0.5% Lidocaine buffered with Sodium Bicarbonate was distributed, to comfortably gain access in real-time with ultrasound guidance. A guidewire was used as necessary, which allowed insertion of the blue introduction catheter. Once positioned, the delivery catheter was prepped and inserted into the introducer sheath. The delivery catheter position was confirmed via ultrasound 5cm distal to Saphenofemoral junction. Following the IFU, adhesive was delivered, segmentally, into the target vein. Ultrasound visualization confirmed successful treatment of the targeted vein with no evidence of complications at the junction. All devices were then removed, and hemostasis was achieved with a suture. Dressings with compression were applied to the access site and to any  puncture sites requiring them. There was minimal blood loss.

## 2024-12-12 NOTE — PATIENT INSTRUCTIONS
Vein Procedure Discharge Instructions and post op appointment: Monday 12/16/2024 @ 2:30 P.m.    For the first 48 hours (2 days) following your procedure:  Walk 15 minutes on every hour (while awake).  Keep leg propped up (recliner, couch, etc.) while sitting.  Take pain medication, if prescribed.  Take Ibuprofen, or Acetaminophen, for the next 2-3 days with an over-the-counter anti-acid (Prilosec, Protonix, Nexium, etc.)    After your 48 hours (2 days) Post Op period complete:  Remove dressings, throw away all except the ACE wrap, keep the ACE wrap.  Shower using warm soapy water.  Use separate wash cloth on the treated leg.  No baths for 2 weeks.  Compression up to groin must be worn daily for 2 weeks.  You may use the ACE wrap for entire leg, or compression hose to knees and ACE wrap to groin.  (If you had your small saphenous vein ablated, then compression only to knee)    Activity:  If you have a normal ultrasound one week after your ablation:  You may resume walking activities immediately.  You may resume jogging 2 weeks after your procedure.  You may resume swimming 2 weeks after your procedure.  If you had microphlebectomies, you must make sure all areas are completely healed before swimming.  You may resume lower body weight lifting 2 weeks after your procedure.  You may resume upper body weight lifting while sitting down 2 days after your procedure.  You may resume sexual activities 2 weeks after your procedure.  You may fly commercially 2 weeks after your procedure.  You may scuba dive 1 month after your procedure.    Pilots - You may not fly for 1 month after your procedure.  You must have a normal 1 month post op ultrasound before returning to flight status.

## 2024-12-12 NOTE — LETTER
December 12, 2024      [facility]  [address]  [place] [address]  Phone: [phone]  Fax: [phone]       Patient: Zachariah Aguilar   YOB: 1970  Date of Visit: 12/12/2024    To Whom It May Concern:    Ilya Aguilar  was at [facility] on 12/12/2024. The patient may return to work/school on 12/16/2024 with weigh restrictions of no more than 15-20 lbs x 2 weeks. If you have any questions or concerns, or if I can be of further assistance, please do not hesitate to contact me.    Sincerely,    Sugar Beckman LPN

## 2024-12-16 ENCOUNTER — HOSPITAL ENCOUNTER (OUTPATIENT)
Dept: RADIOLOGY | Facility: HOSPITAL | Age: 54
Discharge: HOME | End: 2024-12-16
Attending: FAMILY MEDICINE
Payer: COMMERCIAL

## 2024-12-16 ENCOUNTER — OFFICE VISIT (OUTPATIENT)
Dept: VASCULAR SURGERY | Facility: CLINIC | Age: 54
End: 2024-12-16
Payer: COMMERCIAL

## 2024-12-16 VITALS
HEIGHT: 70 IN | BODY MASS INDEX: 35 KG/M2 | RESPIRATION RATE: 20 BRPM | WEIGHT: 244.5 LBS | HEART RATE: 82 BPM | DIASTOLIC BLOOD PRESSURE: 90 MMHG | SYSTOLIC BLOOD PRESSURE: 134 MMHG

## 2024-12-16 DIAGNOSIS — I87.2 VENOUS INSUFFICIENCY: ICD-10-CM

## 2024-12-16 DIAGNOSIS — M79.605 LEG PAIN, BILATERAL: ICD-10-CM

## 2024-12-16 DIAGNOSIS — R60.0 EDEMA, LOWER EXTREMITY: ICD-10-CM

## 2024-12-16 DIAGNOSIS — L81.9 HYPERPIGMENTATION OF SKIN: Primary | ICD-10-CM

## 2024-12-16 DIAGNOSIS — M79.604 LEG PAIN, BILATERAL: ICD-10-CM

## 2024-12-16 PROCEDURE — 99999 PR PBB SHADOW E&M-EST. PATIENT-LVL III: CPT | Mod: PBBFAC,,, | Performed by: FAMILY MEDICINE

## 2024-12-16 PROCEDURE — 1160F RVW MEDS BY RX/DR IN RCRD: CPT | Mod: ,,, | Performed by: FAMILY MEDICINE

## 2024-12-16 PROCEDURE — 93971 EXTREMITY STUDY: CPT | Mod: 26,LT,, | Performed by: FAMILY MEDICINE

## 2024-12-16 PROCEDURE — 99213 OFFICE O/P EST LOW 20 MIN: CPT | Mod: PBBFAC,25 | Performed by: FAMILY MEDICINE

## 2024-12-16 PROCEDURE — 93971 EXTREMITY STUDY: CPT | Mod: TC,LT

## 2024-12-16 PROCEDURE — 1159F MED LIST DOCD IN RCRD: CPT | Mod: ,,, | Performed by: FAMILY MEDICINE

## 2024-12-16 PROCEDURE — 3008F BODY MASS INDEX DOCD: CPT | Mod: ,,, | Performed by: FAMILY MEDICINE

## 2024-12-16 PROCEDURE — 3075F SYST BP GE 130 - 139MM HG: CPT | Mod: ,,, | Performed by: FAMILY MEDICINE

## 2024-12-16 PROCEDURE — 3080F DIAST BP >= 90 MM HG: CPT | Mod: ,,, | Performed by: FAMILY MEDICINE

## 2024-12-16 PROCEDURE — 99214 OFFICE O/P EST MOD 30 MIN: CPT | Mod: S$PBB,,, | Performed by: FAMILY MEDICINE

## 2024-12-16 NOTE — PROGRESS NOTES
[facility] CLINIC NOTE    Patient ID: Zachariah Aguilar is a 54 y.o. male.    I. HISTORY     Chief Complaint:   Chief Complaint   Patient presents with    Follow-up     4D S/P LT SSV VENASEAL ABL         HPI: Zachariah Aguilar is a 54 y.o. male who presents today 4 days post left small saphenous vein non thermal adhesive ablation.  The patient states she did well over the weekend without complications.  He states that he maybe able to tell a little bit of difference with less swelling of the bilateral lower extremities..  Otherwise he can not tell much difference yet.  Post ablation ultrasound today shows a well ablated right great saphenous vein without evidence of chemical induced thrombus.  He continues with postoperative compression as well.  Overall, he is doing well.    Clinical summary:  Patient initially seen on 09/25/2024 by referral from [facility] for evaluation of lower extremity edema, hyperpigmentation, wound and pain.  Symptoms are progressive/worsening and began greater than 5 years ago.  Location is bilateral lower extremities below the knees. Symptoms are worse at the end of the day.  History of venous interventions includes none.  Denies family history of venous disease.  Denies history of DVT or cellulitis.  The patient has been dealing with superficial venous ulcers below the bilateral medial malleoli which have been in various stages of healing over the past several months.  He has used intermittent compression to try to treat these which usually results in pain.  He continues to have edema, hyperpigmentation and pain in addition to these ulcers and feels these symptoms have become life altering would like to have the underlying condition corrected if possible.    Bilateral complete venous reflux study performed 10/23/2024 shows no evidence of DVT bilaterally.  The study also shows dilation and axial reflux of the bilateral great and left small saphenous veins.  Refluxing varicosities also  noted bilaterally.    Venous Disease Medical Necessity Documentation Initial Visit Date:9/25/24 Return Check Date:    Have you ever had a rupture or bleed from a varicose vein in your leg(s)?              [] Yes  [x] No   [] Yes   [] No   Have you ever been diagnosed with phlebitis, cellulitis, or inflammation in the area of the varicose veins of  your leg(s)?  [] Yes  [x] No    [] Yes   [] No   Do you have darkened or inflamed skin on your legs?   [x] Yes   [] No   [] Yes   [] No   Do you have leg swelling?     [x] Yes   [] No   [] Yes   [] No   Do you have leg pain?   [x] Yes   [] No   [] Yes   [] No   If yes, describe the type of pain?    [x]   Stabbing []  Radiating [x]  Aching   [x]  Tightness []  Throbbing               [x]  Burning [x]  Cramping              Do you have leg discomfort?   [x] Yes   [] No   [] Yes   [] No   If yes, describe the type of discomfort?    [x]  Heaviness [x]  Fullness   [x]  Restlessness [x] Tired/Fatigued [] Itching              Have you ever worn compression hose?   [x] Yes   [] No   [] Yes   [] No   If yes, how long? 2M          Do you elevate your legs while sitting?   [x] Yes   [] No   [] Yes   [] No   Does venous disease (varicose veins, ulcers, skin changes, leg pain/swelling) interfere with your daily life?  If yes, check activities you are limited or unable to do.    []  Shower  [x]   Walk  []  Exercise  [] Play with children/grandchildren  []  Shop [] Work [x] Stand for any period of time [x] Sleep                               [x] Sitting for an extended period of time.           [x] Yes   [] No   [] Yes   [] No   Do you exercise/have you tried to exercise (i.e.  Walk our participate in a regular exercise routine)?  [] Yes  [x] No   [] Yes   [] No   BMI 40.9             History reviewed. No pertinent past medical history.     Past Surgical History:   Procedure Laterality Date    ABLATION, CHEMICAL SEALANT, VARICOSE VEIN Right 11/21/2024    Right GSV Venaseal Ablation  performed by Dr. Rahul Gooden    ABLATION, CHEMICAL SEALANT, VARICOSE VEIN Left 12/05/2024    Left GSV Venaseal Ablation performed by Dr. Rahul Gooden    ABLATION, CHEMICAL SEALANT, VARICOSE VEIN Left 12/12/2024    Left SSV Venaseal Ablation performed by Dr. Rahul Gooden       Social History     Tobacco Use   Smoking Status Never   Smokeless Tobacco Not on file         Current Outpatient Medications:     predniSONE (DELTASONE) 10 MG tablet, Take 2 po daily for 2 days. Then, 1 po daily until gone. (Patient not taking: Reported on 11/21/2024), Disp: 7 tablet, Rfl: 0    Review of Systems   Constitutional:  Negative for activity change, chills, diaphoresis, fatigue and fever.   Respiratory:  Negative for cough and shortness of breath.    Cardiovascular:  Positive for leg swelling. Negative for chest pain and claudication.        Hyperpigmentation LE   Gastrointestinal:  Negative for nausea and vomiting.   Musculoskeletal:  Positive for leg pain. Negative for joint swelling.   Integumentary:  Positive for wound. Negative for rash.   Neurological:  Negative for weakness and numbness.          II. PHYSICAL EXAM     Physical Exam  Constitutional:       General: He is awake. He is not in acute distress.     Appearance: Normal appearance. He is overweight. He is not ill-appearing or toxic-appearing.   HENT:      Head: Normocephalic and atraumatic.   Eyes:      Extraocular Movements: Extraocular movements intact.      Conjunctiva/sclera: Conjunctivae normal.      Pupils: Pupils are equal, round, and reactive to light.   Neck:      Vascular: No carotid bruit or JVD.   Cardiovascular:      Rate and Rhythm: Normal rate and regular rhythm.      Pulses:           Dorsalis pedis pulses are detected w/ Doppler on the right side and detected w/ Doppler on the left side.        Posterior tibial pulses are detected w/ Doppler on the right side and detected w/ Doppler on the left side.      Heart sounds: No murmur heard.  Pulmonary:       Effort: Pulmonary effort is normal. No respiratory distress.      Breath sounds: No stridor. No wheezing, rhonchi or rales.   Musculoskeletal:         General: No swelling, tenderness or deformity.      Right lower le+ Edema present.      Left lower le+ Edema present.      Comments: No evidence of active cellulitis bilaterally.  Small stasis ulcers below the bilateral medial malleolus.  See pictures for details.   Feet:      Comments: Triphasic hand-held dopplerable pulses of the bilateral dorsalis pedis and posterior tibial arteries.  Skin:     General: Skin is warm.      Capillary Refill: Capillary refill takes less than 2 seconds.      Coloration: Skin is not ashen.      Findings: No bruising, erythema, lesion, rash or wound.   Neurological:      Mental Status: He is alert and oriented to person, place, and time.      Motor: No weakness.   Psychiatric:         Speech: Speech normal.         Behavior: Behavior normal. Behavior is cooperative.         Reticular/Spider veins noted:  RLE: none  LLE: none    Varicose veins noted:  RLE: none  LLE:  none    CEAP Classification  Clinical Signs: Class 6 - Leg ulceration, skin changes as defined above  Etiologic Classification: Primary  Anatomic distribution: Superficial  Pathophysiologic dysfunction: Reflux                       Venous Clinical Severity Score  Pain:2=Daily, moderate activity limitation, occasional analgesics  Varicose Veins: 0=None  Venous Edema: 2=Afternoon edema, above ankle  Pigmentation: 1=Diffuse, but limited in area and old (brown)  Inflammation: 1=Mild cellulitis, limited to marginal area around ulcer  Induration: 1=Focal, circummalleolar (< 5 cm)  Number of Active Ulcers: 2=2  Active Ulceration, Duration: 1=<3 months  Active Ulcer Size: 1=<2 cm diameter  Compressive Therapy: 1=Intermittent use of stockings  Total Score: 12       III. ASSESSMENT & PLAN (MEDICAL DECISION MAKING)     1. Hyperpigmentation of skin    2. Edema, lower extremity     3. Venous insufficiency    4. Leg pain, bilateral          Assessment/Diagnosis and Plan:  The patient doing well post ablation.  Continue thigh-high compression postoperatively for the next 2 weeks.  He has been encouraged to at least wear knee-high compression along with leg elevation and leg exercise.  Follow-up in one-month.  No ultrasound unless he is having trouble.          Wolf Gooden, DO

## 2025-01-13 ENCOUNTER — OFFICE VISIT (OUTPATIENT)
Dept: FAMILY MEDICINE | Facility: CLINIC | Age: 55
End: 2025-01-13
Payer: COMMERCIAL

## 2025-01-13 VITALS
RESPIRATION RATE: 18 BRPM | HEIGHT: 70 IN | TEMPERATURE: 98 F | OXYGEN SATURATION: 97 % | DIASTOLIC BLOOD PRESSURE: 86 MMHG | HEART RATE: 98 BPM | SYSTOLIC BLOOD PRESSURE: 135 MMHG | WEIGHT: 252 LBS | BODY MASS INDEX: 36.08 KG/M2

## 2025-01-13 DIAGNOSIS — Z12.11 ENCOUNTER FOR SCREENING COLONOSCOPY: ICD-10-CM

## 2025-01-13 DIAGNOSIS — K40.90 INGUINAL HERNIA WITHOUT OBSTRUCTION OR GANGRENE, RECURRENCE NOT SPECIFIED, UNSPECIFIED LATERALITY: ICD-10-CM

## 2025-01-13 DIAGNOSIS — R32 URINARY INCONTINENCE, UNSPECIFIED TYPE: Primary | ICD-10-CM

## 2025-01-13 DIAGNOSIS — R07.9 CHEST PAIN, UNSPECIFIED TYPE: ICD-10-CM

## 2025-01-13 LAB
EKG 12-LEAD: NORMAL
HEART RATE: 92
Lab: NORMAL
PR INTERVAL: 160
PRT AXES: NORMAL
QRS DURATION: 90
QT/QTC: NORMAL
VENTRICULAR RATE: NORMAL

## 2025-01-13 PROCEDURE — 3075F SYST BP GE 130 - 139MM HG: CPT | Mod: ,,, | Performed by: INTERNAL MEDICINE

## 2025-01-13 PROCEDURE — 3008F BODY MASS INDEX DOCD: CPT | Mod: ,,, | Performed by: INTERNAL MEDICINE

## 2025-01-13 PROCEDURE — 99214 OFFICE O/P EST MOD 30 MIN: CPT | Mod: ,,, | Performed by: INTERNAL MEDICINE

## 2025-01-13 PROCEDURE — 3079F DIAST BP 80-89 MM HG: CPT | Mod: ,,, | Performed by: INTERNAL MEDICINE

## 2025-01-13 PROCEDURE — 1159F MED LIST DOCD IN RCRD: CPT | Mod: ,,, | Performed by: INTERNAL MEDICINE

## 2025-01-13 PROCEDURE — G0103 PSA SCREENING: HCPCS | Mod: ,,, | Performed by: CLINICAL MEDICAL LABORATORY

## 2025-01-13 PROCEDURE — 81003 URINALYSIS AUTO W/O SCOPE: CPT | Mod: QW,,, | Performed by: CLINICAL MEDICAL LABORATORY

## 2025-01-13 PROCEDURE — 80048 BASIC METABOLIC PNL TOTAL CA: CPT | Mod: ,,, | Performed by: CLINICAL MEDICAL LABORATORY

## 2025-01-13 PROCEDURE — 93005 ELECTROCARDIOGRAM TRACING: CPT | Mod: ,,, | Performed by: INTERNAL MEDICINE

## 2025-01-13 RX ORDER — TAMSULOSIN HYDROCHLORIDE 0.4 MG/1
0.4 CAPSULE ORAL DAILY
Qty: 90 CAPSULE | Refills: 1 | Status: SHIPPED | OUTPATIENT
Start: 2025-01-13

## 2025-01-14 LAB
ANION GAP SERPL CALCULATED.3IONS-SCNC: 15 MMOL/L (ref 7–16)
BILIRUB UR QL STRIP: NEGATIVE
BUN SERPL-MCNC: 13 MG/DL (ref 8–26)
BUN/CREAT SERPL: 12 (ref 6–20)
CALCIUM SERPL-MCNC: 9.3 MG/DL (ref 8.4–10.2)
CHLORIDE SERPL-SCNC: 107 MMOL/L (ref 98–107)
CLARITY UR: CLEAR
CO2 SERPL-SCNC: 22 MMOL/L (ref 22–29)
COLOR UR: NORMAL
CREAT SERPL-MCNC: 1.13 MG/DL (ref 0.72–1.25)
EGFR (NO RACE VARIABLE) (RUSH/TITUS): 77 ML/MIN/1.73M2
GLUCOSE SERPL-MCNC: 79 MG/DL (ref 74–100)
GLUCOSE UR STRIP-MCNC: NORMAL MG/DL
KETONES UR STRIP-SCNC: NEGATIVE MG/DL
LEUKOCYTE ESTERASE UR QL STRIP: NEGATIVE
NITRITE UR QL STRIP: NEGATIVE
PH UR STRIP: 7 PH UNITS
POTASSIUM SERPL-SCNC: 4.4 MMOL/L (ref 3.5–5.1)
PROT UR QL STRIP: NEGATIVE
PSA SERPL-MCNC: 2.04 NG/ML
RBC # UR STRIP: NEGATIVE /UL
SODIUM SERPL-SCNC: 140 MMOL/L (ref 136–145)
SP GR UR STRIP: 1.02
UROBILINOGEN UR STRIP-ACNC: NORMAL MG/DL

## 2025-01-14 NOTE — PROGRESS NOTES
"Subjective:       Patient ID: Zachariah Aguilar is a 54 y.o. male.    Chief Complaint: Establish Care and Urinary Incontinence    History of Present Illness    CHIEF COMPLAINT:  Patient presents today for follow-up regarding venous insufficiency and other health concerns.    VENOUS INSUFFICIENCY:  He reports his venous insufficiency is nearly healed. He has a history of dilation and reflux of the bilateral great vein. He experiences leg discomfort primarily late in the day.    INGUINAL HERNIA:  He reports recent onset of pain associated with inguinal hernia and notes intermittent unilateral puffiness in the affected area.    GENITOURINARY:  He reports decreased urinary control with occasional urgency. He notes residual urination after voiding with manual pressure.    CHEST PAIN:  He reports intermittent chest pain today, rated 5-6/10, lasting few seconds.         Current Medications:    Current Outpatient Medications:     predniSONE (DELTASONE) 10 MG tablet, Take 2 po daily for 2 days. Then, 1 po daily until gone. (Patient not taking: Reported on 1/13/2025), Disp: 7 tablet, Rfl: 0    tamsulosin (FLOMAX) 0.4 mg Cap, Take 1 capsule (0.4 mg total) by mouth once daily., Disp: 90 capsule, Rfl: 1           ROS  Twelve point system reviewed, unremarkable except for stated above in HPI.        Objective:         Vitals:    01/13/25 1554   BP: 135/86   BP Location: Left arm   Patient Position: Sitting   Pulse: 98   Resp: 18   Temp: 97.7 °F (36.5 °C)   TempSrc: Temporal   SpO2: 97%   Weight: 114.3 kg (252 lb)   Height: 5' 10" (1.778 m)        Physical Exam     Patient is awake alert oriented person place and  Lungs are clear to auscultation bilaterally no crackles or wheezes   Cardiovascular S1-S2 regular rate and rhythm no murmurs rubs or gallops   Abdomen is soft positive bowel sounds nontender, extremities no clubbing cyanosis edema  Neuro no focal neurological deficits  Skin warm and dry.     Last Labs:     Office Visit " on 01/13/2025   Component Date Value    HI Interval 01/13/2025 160     QRS Duration 01/13/2025 90     QT/QTc 01/13/2025 346/430     PRT Axes 01/13/2025 49/28/30     Heart Rate 01/13/2025 92     Results 01/13/2025         Last Imaging:  US Post Ablation Venous Left  Narrative: EXAMINATION:  US POST ABLATION VENOUS LEFT    CLINICAL HISTORY:  Three days status post left small saphenous vein non thermal adhesive ablation.    COMPARISON:  None.    TECHNIQUE:  Patient was placed in the supine position.  Compression and non-compression images were saved for the record.  Theleft sapheno popliteal junction, popliteal vein and small saphenous vein were visualized interrogated.    FINDINGS:  The left sapheno popliteal junction and popliteal vein appear to be widely patent with no evidence of EHIT phenomena or thrombosis.  The left small saphenous vein appears to be ablated as expected.  Impression: Status post ablation ultrasound with no evidence of recannulization or complication.    Electronically signed by: Wolf Gooden  Date:    12/18/2024  Time:    08:08         **Labs and x-rays personally reviewed by me    ** reviewed           Assessment & Plan:   Assessment & Plan    IMPRESSION:  - Assessed venous insufficiency, previously treated by Dr. Keith  - Evaluated inguinal hernia, considering potential for surgical intervention  - Investigated urinary symptoms, suspecting possible enlarged prostate or urinary retention/overflow incontinence  - Addressed chest pain complaint, albeit infrequent and short-lasting  - Noted dental issues requiring attention    VENOUS INSUFFICIENCY:  - Evaluated the patient's venous insufficiency, which has now healed.  - Noted dilation and reflux of the bilateral great vein.  - Confirmed no blood clot was found.  - Advised the patient to wear support stockings for venous insufficiency.  - Instructed the patient to keep legs elevated to manage venous insufficiency.  - Noted that the patient  reports having a bad leg late in the day.    HERNIA:  - Examined the hernia and found it to be slightly larger on one side.  - Ordered a CT to assess the hernia.  - Discussed shared decision-making process for hernia repair based on size and discomfort level.  - Referred the patient to Dr. Bahena in General Surgery for hernia evaluation and potential repair.    URINARY ISSUES/PROSTATE PROBLEMS:  - Assessed that the urinary symptoms may be related to prostate problems.  - Prescribed medication to slow down urinary urgency.  - Ordered PSA levels and urine tests.  - Scheduled a bladder scan.  - Referred the patient to a urologist for further evaluation.  - Suspected enlarged prostate based on urinary symptoms.  - Ordered labs, including PSA test.  - Referred the patient to Dr. Bennett in Urology for prostate evaluation, rectal exam, and further management.  - Noted the patient reports difficulty urinating and sometimes needs to press to initiate urination.  - Suspected urinary retention or overflow incontinence based on patient's symptoms.  - Ordered urinalysis and bladder scan to assess urinary retention.  - Referred the patient to a urologist for further evaluation and management of urinary symptoms.    DENTAL HEALTH:  - Observed several cavities during oral exam.  - Inquired about recent dental visits.  - Referred the patient to a dentist for cavity treatment.    COLON CANCER SCREENING:  - Emphasized the importance of colonoscopy at age 50 for colon cancer screening.  - Scheduled a colonoscopy for the patient as part of health maintenance.    GENERAL HEALTH ASSESSMENT:  - Performed a general physical exam including eye, throat, and neurological checks.  - Ordered EKG to evaluate reported chest pain.  - Ordered X-ray of stomach.  - Planned comprehensive health maintenance checks including labs, urine tests, and various screenings.    FOLLOW UP:  - Scheduled follow-up visit in 3 weeks.  - Instructed the patient to  contact the office if any abnormal test results are found.           1. Urinary incontinence, unspecified type  -     X-Ray KUB; Future; Expected date: 01/13/2025  -     Urinalysis, Reflex to Urine Culture  -     Ambulatory referral/consult to Urology; Future; Expected date: 01/20/2025  -     US Pelvis Limited Non OB; Future; Expected date: 01/13/2025    2. Inguinal hernia without obstruction or gangrene, recurrence not specified, unspecified laterality  -     Basic Metabolic Panel; Future; Expected date: 01/13/2025  -     PSA, Screening; Future; Expected date: 01/13/2025  -     Hemoglobin A1C; Future; Expected date: 01/13/2025  -     CT Pelvis With IV Contrast Routine Oral Contrast; Future; Expected date: 01/13/2025  -     Ambulatory referral/consult to General Surgery; Future; Expected date: 01/20/2025    3. Chest pain, unspecified type  -     POCT EKG 12-LEAD (Manually Resulted by Ordering Provider)    4. Encounter for screening colonoscopy  -     Colonoscopy; Future; Expected date: 01/13/2025    Other orders  -     tamsulosin (FLOMAX) 0.4 mg Cap; Take 1 capsule (0.4 mg total) by mouth once daily.  Dispense: 90 capsule; Refill: 1            Mac Arrington MD  This note was generated with the assistance of ambient listening technology. Verbal consent was obtained by the patient and accompanying visitor(s) for the recording of patient appointment to facilitate this note. I attest to having reviewed and edited the generated note for accuracy, though some syntax or spelling errors may persist. Please contact the author of this note for any clarification.

## 2025-01-17 ENCOUNTER — TELEPHONE (OUTPATIENT)
Dept: FAMILY MEDICINE | Facility: CLINIC | Age: 55
End: 2025-01-17
Payer: COMMERCIAL

## 2025-01-17 ENCOUNTER — PATIENT MESSAGE (OUTPATIENT)
Dept: FAMILY MEDICINE | Facility: CLINIC | Age: 55
End: 2025-01-17
Payer: COMMERCIAL

## 2025-01-17 NOTE — TELEPHONE ENCOUNTER
----- Message from Mac Arrington MD sent at 1/16/2025 12:15 PM CST -----  MiraLax 1 pack per day x4 days  Number [phone]    [id number] Message sent to patient portal

## 2025-01-22 ENCOUNTER — HOSPITAL ENCOUNTER (OUTPATIENT)
Dept: RADIOLOGY | Facility: HOSPITAL | Age: 55
Discharge: HOME | End: 2025-01-22
Attending: INTERNAL MEDICINE
Payer: COMMERCIAL

## 2025-01-22 ENCOUNTER — OFFICE VISIT (OUTPATIENT)
Dept: VASCULAR SURGERY | Facility: CLINIC | Age: 55
End: 2025-01-22
Payer: COMMERCIAL

## 2025-01-22 VITALS
BODY MASS INDEX: 36.02 KG/M2 | WEIGHT: 251.63 LBS | SYSTOLIC BLOOD PRESSURE: 135 MMHG | RESPIRATION RATE: 16 BRPM | HEIGHT: 70 IN | DIASTOLIC BLOOD PRESSURE: 86 MMHG | HEART RATE: 98 BPM

## 2025-01-22 DIAGNOSIS — L81.9 HYPERPIGMENTATION OF SKIN: ICD-10-CM

## 2025-01-22 DIAGNOSIS — M25.562 PAIN IN BOTH KNEES, UNSPECIFIED CHRONICITY: Primary | ICD-10-CM

## 2025-01-22 DIAGNOSIS — M79.604 LEG PAIN, BILATERAL: ICD-10-CM

## 2025-01-22 DIAGNOSIS — R32 URINARY INCONTINENCE, UNSPECIFIED TYPE: ICD-10-CM

## 2025-01-22 DIAGNOSIS — M25.561 PAIN IN BOTH KNEES, UNSPECIFIED CHRONICITY: Primary | ICD-10-CM

## 2025-01-22 DIAGNOSIS — K40.90 INGUINAL HERNIA WITHOUT OBSTRUCTION OR GANGRENE, RECURRENCE NOT SPECIFIED, UNSPECIFIED LATERALITY: ICD-10-CM

## 2025-01-22 DIAGNOSIS — M79.605 LEG PAIN, BILATERAL: ICD-10-CM

## 2025-01-22 DIAGNOSIS — I87.2 VENOUS INSUFFICIENCY: ICD-10-CM

## 2025-01-22 PROCEDURE — 76857 US EXAM PELVIC LIMITED: CPT | Mod: TC

## 2025-01-22 PROCEDURE — 72193 CT PELVIS W/DYE: CPT | Mod: TC

## 2025-01-22 PROCEDURE — 99214 OFFICE O/P EST MOD 30 MIN: CPT | Mod: S$PBB,,, | Performed by: FAMILY MEDICINE

## 2025-01-22 PROCEDURE — 99214 OFFICE O/P EST MOD 30 MIN: CPT | Mod: PBBFAC,25 | Performed by: FAMILY MEDICINE

## 2025-01-22 PROCEDURE — A9698 NON-RAD CONTRAST MATERIALNOC: HCPCS | Performed by: INTERNAL MEDICINE

## 2025-01-22 PROCEDURE — 1159F MED LIST DOCD IN RCRD: CPT | Mod: ,,, | Performed by: FAMILY MEDICINE

## 2025-01-22 PROCEDURE — 25500020 PHARM REV CODE 255: Performed by: INTERNAL MEDICINE

## 2025-01-22 PROCEDURE — 1160F RVW MEDS BY RX/DR IN RCRD: CPT | Mod: ,,, | Performed by: FAMILY MEDICINE

## 2025-01-22 PROCEDURE — 3075F SYST BP GE 130 - 139MM HG: CPT | Mod: ,,, | Performed by: FAMILY MEDICINE

## 2025-01-22 PROCEDURE — 3079F DIAST BP 80-89 MM HG: CPT | Mod: ,,, | Performed by: FAMILY MEDICINE

## 2025-01-22 PROCEDURE — 3008F BODY MASS INDEX DOCD: CPT | Mod: ,,, | Performed by: FAMILY MEDICINE

## 2025-01-22 PROCEDURE — 99999 PR PBB SHADOW E&M-EST. PATIENT-LVL IV: CPT | Mod: PBBFAC,,, | Performed by: FAMILY MEDICINE

## 2025-01-22 RX ORDER — IOPAMIDOL 755 MG/ML
100 INJECTION, SOLUTION INTRAVASCULAR
Status: COMPLETED | OUTPATIENT
Start: 2025-01-22 | End: 2025-01-22

## 2025-01-22 RX ADMIN — IOPAMIDOL 100 ML: 755 INJECTION, SOLUTION INTRAVENOUS at 09:01

## 2025-01-22 RX ADMIN — BARIUM SULFATE 900 ML: 20 SUSPENSION ORAL at 09:01

## 2025-01-22 NOTE — PROGRESS NOTES
VE[facility]ENTER CLINIC NOTE    Patient ID: Zachariah Aguilar is a 55 y.o. male.    I. HISTORY     Chief Complaint:   Chief Complaint   Patient presents with    Follow-up     1M PA         HPI: Zachariah Aguilar is a 55 y.o. male who presents today for a one-month post ablation visit after undergoing a bilateral great saphenous vein and a left small saphenous vein non thermal adhesive ablation.  The patient states that he has noted improvement in leg heaviness and swelling.  However, he has continued to have leg pain and stiffness.  He states bilateral knee pain, right worse than left.  Worse after he has been sitting for awhile or lying in bed.  He states he thinks he has arthritis in his right knee, but has not seen an orthopedic/sports medicine specialist for this.  No ultrasound today.  Seems to be doing well from a venous perspective.    Clinical summary:  Patient initially seen on 09/25/2024 by referral from [facility] for evaluation of lower extremity edema, hyperpigmentation, wound and pain.  Symptoms are progressive/worsening and began greater than 5 years ago.  Location is bilateral lower extremities below the knees. Symptoms are worse at the end of the day.  History of venous interventions includes none.  Denies family history of venous disease.  Denies history of DVT or cellulitis.  The patient has been dealing with superficial venous ulcers below the bilateral medial malleoli which have been in various stages of healing over the past several months.  He has used intermittent compression to try to treat these which usually results in pain.  He continues to have edema, hyperpigmentation and pain in addition to these ulcers and feels these symptoms have become life altering would like to have the underlying condition corrected if possible.    Bilateral complete venous reflux study performed 10/23/2024 shows no evidence of DVT bilaterally.  The study also shows dilation and axial reflux of the bilateral great  and left small saphenous veins.  Refluxing varicosities also noted bilaterally.    Venous Disease Medical Necessity Documentation Initial Visit Date:9/25/24 Return Check Date:    Have you ever had a rupture or bleed from a varicose vein in your leg(s)?              [] Yes  [x] No   [] Yes   [] No   Have you ever been diagnosed with phlebitis, cellulitis, or inflammation in the area of the varicose veins of  your leg(s)?  [] Yes  [x] No    [] Yes   [] No   Do you have darkened or inflamed skin on your legs?   [x] Yes   [] No   [] Yes   [] No   Do you have leg swelling?     [x] Yes   [] No   [] Yes   [] No   Do you have leg pain?   [x] Yes   [] No   [] Yes   [] No   If yes, describe the type of pain?    [x]   Stabbing []  Radiating [x]  Aching   [x]  Tightness []  Throbbing               [x]  Burning [x]  Cramping              Do you have leg discomfort?   [x] Yes   [] No   [] Yes   [] No   If yes, describe the type of discomfort?    [x]  Heaviness [x]  Fullness   [x]  Restlessness [x] Tired/Fatigued [] Itching              Have you ever worn compression hose?   [x] Yes   [] No   [] Yes   [] No   If yes, how long? 2M          Do you elevate your legs while sitting?   [x] Yes   [] No   [] Yes   [] No   Does venous disease (varicose veins, ulcers, skin changes, leg pain/swelling) interfere with your daily life?  If yes, check activities you are limited or unable to do.    []  Shower  [x]   Walk  []  Exercise  [] Play with children/grandchildren  []  Shop [] Work [x] Stand for any period of time [x] Sleep                               [x] Sitting for an extended period of time.           [x] Yes   [] No   [] Yes   [] No   Do you exercise/have you tried to exercise (i.e.  Walk our participate in a regular exercise routine)?  [] Yes  [x] No   [] Yes   [] No   BMI 40.9             History reviewed. No pertinent past medical history.     Past Surgical History:   Procedure Laterality Date    ABLATION, CHEMICAL SEALANT,  VARICOSE VEIN Right 11/21/2024    Right GSV Venaseal Ablation performed by Dr. Rahul Gooden    ABLATION, CHEMICAL SEALANT, VARICOSE VEIN Left 12/05/2024    Left GSV Venaseal Ablation performed by Dr. Rahul Gooden    ABLATION, CHEMICAL SEALANT, VARICOSE VEIN Left 12/12/2024    Left SSV Venaseal Ablation performed by Dr. Rahul Gooden       Social History     Tobacco Use   Smoking Status Never   Smokeless Tobacco Not on file         Current Outpatient Medications:     predniSONE (DELTASONE) 10 MG tablet, Take 2 po daily for 2 days. Then, 1 po daily until gone., Disp: 7 tablet, Rfl: 0    tamsulosin (FLOMAX) 0.4 mg Cap, Take 1 capsule (0.4 mg total) by mouth once daily., Disp: 90 capsule, Rfl: 1  No current facility-administered medications for this visit.    Review of Systems   Constitutional:  Negative for activity change, chills, diaphoresis, fatigue and fever.   Respiratory:  Negative for cough and shortness of breath.    Cardiovascular:  Positive for leg swelling. Negative for chest pain and claudication.        Hyperpigmentation LE   Gastrointestinal:  Negative for nausea and vomiting.   Musculoskeletal:  Positive for leg pain. Negative for joint swelling.   Integumentary:  Positive for wound. Negative for rash.   Neurological:  Negative for weakness and numbness.          II. PHYSICAL EXAM     Physical Exam  Constitutional:       General: He is awake. He is not in acute distress.     Appearance: Normal appearance. He is overweight. He is not ill-appearing or toxic-appearing.   HENT:      Head: Normocephalic and atraumatic.   Eyes:      Extraocular Movements: Extraocular movements intact.      Conjunctiva/sclera: Conjunctivae normal.      Pupils: Pupils are equal, round, and reactive to light.   Neck:      Vascular: No carotid bruit or JVD.   Cardiovascular:      Rate and Rhythm: Normal rate and regular rhythm.      Pulses:           Dorsalis pedis pulses are detected w/ Doppler on the right side and detected w/  Doppler on the left side.        Posterior tibial pulses are detected w/ Doppler on the right side and detected w/ Doppler on the left side.      Heart sounds: No murmur heard.  Pulmonary:      Effort: Pulmonary effort is normal. No respiratory distress.      Breath sounds: No stridor. No wheezing, rhonchi or rales.   Musculoskeletal:         General: No swelling, tenderness or deformity.      Right lower le+ Edema present.      Left lower le+ Edema present.      Comments: No evidence of active cellulitis bilaterally.  Small stasis ulcers below the bilateral medial malleolus.  See pictures for details.   Feet:      Comments: Triphasic hand-held dopplerable pulses of the bilateral dorsalis pedis and posterior tibial arteries.  Skin:     General: Skin is warm.      Capillary Refill: Capillary refill takes less than 2 seconds.      Coloration: Skin is not ashen.      Findings: No bruising, erythema, lesion, rash or wound.   Neurological:      Mental Status: He is alert and oriented to person, place, and time.      Motor: No weakness.   Psychiatric:         Speech: Speech normal.         Behavior: Behavior normal. Behavior is cooperative.         Reticular/Spider veins noted:  RLE: none  LLE: none    Varicose veins noted:  RLE: none  LLE:  none    CEAP Classification  Clinical Signs: Class 6 - Leg ulceration, skin changes as defined above  Etiologic Classification: Primary  Anatomic distribution: Superficial  Pathophysiologic dysfunction: Reflux       Venous Clinical Severity Score  Pain:2=Daily, moderate activity limitation, occasional analgesics  Varicose Veins: 0=None  Venous Edema: 2=Afternoon edema, above ankle  Pigmentation: 1=Diffuse, but limited in area and old (brown)  Inflammation: 1=Mild cellulitis, limited to marginal area around ulcer  Induration: 1=Focal, circummalleolar (< 5 cm)  Number of Active Ulcers: 2=2  Active Ulceration, Duration: 1=<3 months  Active Ulcer Size: 1=<2 cm  diameter  Compressive Therapy: 1=Intermittent use of stockings  Total Score: 12       III. ASSESSMENT & PLAN (MEDICAL DECISION MAKING)     1. Pain in both knees, unspecified chronicity    2. Hyperpigmentation of skin    3. Venous insufficiency    4. Leg pain, bilateral            Assessment/Diagnosis and Plan:  The patient doing well post ablation from a venous perspective.  He has been encouraged to at least wear knee-high compression along with leg elevation and leg exercise.  Follow-up in 2 months for three-month post ablation visit.  No ultrasound unless he is having trouble.    Patient's symptoms seem to be more consistent with musculoskeletal pain such as bilateral knee pain.  I will refer the patient to orthopedics for further evaluation and treatment.    Orders Placed This Encounter    Ambulatory referral/consult to Orthopedics          Wolf Gooden, DO

## 2025-01-23 ENCOUNTER — TELEPHONE (OUTPATIENT)
Dept: FAMILY MEDICINE | Facility: CLINIC | Age: 55
End: 2025-01-23
Payer: COMMERCIAL

## 2025-01-23 NOTE — TELEPHONE ENCOUNTER
----- Message from Mac Arrington MD sent at 1/22/2025  5:51 PM CST -----  Need to see in  1 week please  abnl results     0802 Pt is scheduled for 02/03/25

## 2025-02-03 ENCOUNTER — OFFICE VISIT (OUTPATIENT)
Dept: FAMILY MEDICINE | Facility: CLINIC | Age: 55
End: 2025-02-03
Payer: COMMERCIAL

## 2025-02-03 VITALS
RESPIRATION RATE: 17 BRPM | OXYGEN SATURATION: 99 % | DIASTOLIC BLOOD PRESSURE: 84 MMHG | WEIGHT: 244 LBS | SYSTOLIC BLOOD PRESSURE: 132 MMHG | HEIGHT: 70 IN | HEART RATE: 90 BPM | TEMPERATURE: 98 F | BODY MASS INDEX: 34.93 KG/M2

## 2025-02-03 DIAGNOSIS — M17.11 OSTEOARTHRITIS OF RIGHT KNEE, UNSPECIFIED OSTEOARTHRITIS TYPE: Primary | ICD-10-CM

## 2025-02-03 LAB
CRP SERPL HS-MCNC: 5.48 MG/L
ERYTHROCYTE [SEDIMENTATION RATE] IN BLOOD BY WESTERGREN METHOD: 8 MM/HR (ref 0–20)
URATE SERPL-MCNC: 5.1 MG/DL (ref 3.5–7.2)

## 2025-02-03 PROCEDURE — 85651 RBC SED RATE NONAUTOMATED: CPT | Mod: ,,, | Performed by: CLINICAL MEDICAL LABORATORY

## 2025-02-03 PROCEDURE — 84550 ASSAY OF BLOOD/URIC ACID: CPT | Mod: ,,, | Performed by: CLINICAL MEDICAL LABORATORY

## 2025-02-03 PROCEDURE — 99214 OFFICE O/P EST MOD 30 MIN: CPT | Mod: ,,, | Performed by: INTERNAL MEDICINE

## 2025-02-03 PROCEDURE — 3008F BODY MASS INDEX DOCD: CPT | Mod: ,,, | Performed by: INTERNAL MEDICINE

## 2025-02-03 PROCEDURE — 3079F DIAST BP 80-89 MM HG: CPT | Mod: ,,, | Performed by: INTERNAL MEDICINE

## 2025-02-03 PROCEDURE — 86141 C-REACTIVE PROTEIN HS: CPT | Mod: ,,, | Performed by: CLINICAL MEDICAL LABORATORY

## 2025-02-03 PROCEDURE — 3075F SYST BP GE 130 - 139MM HG: CPT | Mod: ,,, | Performed by: INTERNAL MEDICINE

## 2025-02-03 PROCEDURE — 1159F MED LIST DOCD IN RCRD: CPT | Mod: ,,, | Performed by: INTERNAL MEDICINE

## 2025-02-03 RX ORDER — DICLOFENAC SODIUM 75 MG/1
75 TABLET, DELAYED RELEASE ORAL 2 TIMES DAILY
Qty: 30 TABLET | Refills: 2 | Status: SHIPPED | OUTPATIENT
Start: 2025-02-03

## 2025-02-05 ENCOUNTER — TELEPHONE (OUTPATIENT)
Dept: ORTHOPEDICS | Facility: CLINIC | Age: 55
End: 2025-02-05
Payer: COMMERCIAL

## 2025-02-10 ENCOUNTER — OFFICE VISIT (OUTPATIENT)
Dept: SURGERY | Facility: CLINIC | Age: 55
End: 2025-02-10
Attending: SURGERY
Payer: COMMERCIAL

## 2025-02-10 VITALS — BODY MASS INDEX: 35.01 KG/M2 | HEIGHT: 70 IN

## 2025-02-10 DIAGNOSIS — K40.90 INGUINAL HERNIA OF LEFT SIDE WITHOUT OBSTRUCTION OR GANGRENE: ICD-10-CM

## 2025-02-10 DIAGNOSIS — Z01.818 PRE-PROCEDURAL EXAMINATION: ICD-10-CM

## 2025-02-10 DIAGNOSIS — K40.90 INGUINAL HERNIA WITHOUT OBSTRUCTION OR GANGRENE, RECURRENCE NOT SPECIFIED, UNSPECIFIED LATERALITY: Primary | ICD-10-CM

## 2025-02-10 PROCEDURE — 99214 OFFICE O/P EST MOD 30 MIN: CPT | Mod: PBBFAC | Performed by: SURGERY

## 2025-02-10 PROCEDURE — 3008F BODY MASS INDEX DOCD: CPT | Mod: ,,, | Performed by: SURGERY

## 2025-02-10 PROCEDURE — 1159F MED LIST DOCD IN RCRD: CPT | Mod: ,,, | Performed by: SURGERY

## 2025-02-10 PROCEDURE — 99999 PR PBB SHADOW E&M-EST. PATIENT-LVL IV: CPT | Mod: PBBFAC,,, | Performed by: SURGERY

## 2025-02-10 PROCEDURE — 99204 OFFICE O/P NEW MOD 45 MIN: CPT | Mod: S$PBB,,, | Performed by: SURGERY

## 2025-02-10 NOTE — PROGRESS NOTES
"Subjective:       Patient ID: Zachariah Aguilar is a 55 y.o. male.    Chief Complaint: Osteoarthritis (Of right knee)    History of Present Illness    CHIEF COMPLAINT:  Patient presents today for right knee pain and swelling.    MUSCULOSKELETAL:  He reports right knee pain that improves with rest and when non-weight bearing. Pain notably improved during recent flu illness when not working. He currently notes knee swelling. He denies prior orthopedic evaluation for the knee condition.    MEDICATIONS:  He takes off-brand pain medication 500 mg for arthritis management.    MEDICAL HISTORY:  His history includes influenza infection last summer, known splenic cyst, and left inguinal hernia.         Current Medications:    Current Outpatient Medications:     predniSONE (DELTASONE) 10 MG tablet, Take 2 po daily for 2 days. Then, 1 po daily until gone., Disp: 7 tablet, Rfl: 0    tamsulosin (FLOMAX) 0.4 mg Cap, Take 1 capsule (0.4 mg total) by mouth once daily., Disp: 90 capsule, Rfl: 1    diclofenac (VOLTAREN) 75 MG EC tablet, Take 1 tablet (75 mg total) by mouth 2 (two) times daily., Disp: 30 tablet, Rfl: 2           ROS  Twelve point system reviewed, unremarkable except for stated above in HPI.        Objective:         Vitals:    02/03/25 1517   BP: 132/84   BP Location: Left arm   Patient Position: Sitting   Pulse: 90   Resp: 17   Temp: 97.8 °F (36.6 °C)   TempSrc: Temporal   SpO2: 99%   Weight: 110.7 kg (244 lb)   Height: 5' 10" (1.778 m)        Physical Exam     Patient is awake alert oriented person place and  Lungs are clear to auscultation bilaterally no crackles or wheezes   Cardiovascular S1-S2 regular rate and rhythm no murmurs rubs or gallops   Abdomen is soft positive bowel sounds nontender, extremities no clubbing cyanosis edema  Neuro no focal neurological deficits  Skin warm and dry.     Last Labs:     Office Visit on 02/03/2025   Component Date Value    ESR Westergren 02/03/2025 8     CRP, High Senstivity " 02/03/2025 5.48 (H)     Uric Acid 02/03/2025 5.1    Office Visit on 01/13/2025   Component Date Value    LA Interval 01/13/2025 160     QRS Duration 01/13/2025 90     QT/QTc 01/13/2025 346/430     PRT Axes 01/13/2025 49/28/30     Heart Rate 01/13/2025 92     Results 01/13/2025      Color, UA 01/13/2025 Light Yellow     Clarity, UA 01/13/2025 Clear     pH, UA 01/13/2025 7.0     Leukocytes, UA 01/13/2025 Negative     Nitrites, UA 01/13/2025 Negative     Protein, UA 01/13/2025 Negative     Glucose, UA 01/13/2025 Normal     Ketones, UA 01/13/2025 Negative     Urobilinogen, UA 01/13/2025 Normal     Bilirubin, UA 01/13/2025 Negative     Blood, UA 01/13/2025 Negative     Specific Gravity, UA 01/13/2025 1.024     Sodium 01/13/2025 140     Potassium 01/13/2025 4.4     Chloride 01/13/2025 107     CO2 01/13/2025 22     Anion Gap 01/13/2025 15     Glucose 01/13/2025 79     BUN 01/13/2025 13     Creatinine 01/13/2025 1.13     BUN/Creatinine Ratio 01/13/2025 12     Calcium 01/13/2025 9.3     eGFR 01/13/2025 77     PSA Total 01/13/2025 2.037        Last Imaging:  X-Ray Knee 1 or 2 View Right  Narrative: EXAMINATION:  XR KNEE 1 OR 2 VIEW RIGHT    CLINICAL HISTORY:  Unilateral primary osteoarthritis, right knee    TECHNIQUE:  AP and lateral views of the right knee were performed.    COMPARISON:  09/16/2024    FINDINGS:  No fracture, dislocation, or joint effusion.  No significant degenerative change.  Soft tissues are unremarkable.  Impression: No significant right knee pathology.    Electronically signed by: Ramu Zaragoza MD  Date:    02/04/2025  Time:    13:33         **Labs and x-rays personally reviewed by me    ** reviewed           Assessment & Plan:   Assessment & Plan    IMPRESSION:  - Evaluated patient's right knee pain, considering osteoarthritis vs. gout as potential causes  - Noted joint effusion in the right knee, warranting further investigation  - Will order x-ray and blood test to rule out gout and assess  inflammation levels  - Prescribed Voltaren for pain management, anticipating significant improvement    RIGHT KNEE PAIN AND SWELLING:  - Patient reports right knee pain and swelling, which improves when not working.  - Patient reports right knee pain that improves with rest.  - Observed swelling in the right knee, indicating joint effusion.  - Explained joint effusion and its significance in knee swelling.  - Ordered x-ray of right knee.  - Ordered labs to check for inflammation and rule out other conditions.  - Prescribed Voltaren for right knee pain and swelling.  - Prescribed Voltaren (diclofenac sodium topical gel) for right knee pain.  - Instructed the patient to obtain Voltaren from the .    POSSIBLE GOUT VS OSTEOARTHRITIS:  - Explained the possibility of gout as an alternative diagnosis to osteoarthritis.  - Discussed the importance of differentiating between gout and osteoarthritis due to different treatment approaches.  - Ordered blood test to check for inflammation and rule out gout.    ARTHRITIS:  - Prescribed ibuprofen 500mg daily as needed for arthritis pain, not to exceed twice daily.    LEFT INGUINAL HERNIA:  - Patient has a left inguinal hernia.  - Suggested rescheduling an appointment, possibly for hernia-related follow-up.    INFLUENZA:  - Patient reports having had influenza last summer.    SPLENIC CYST:  - Patient has a small cyst on the spleen, previously identified by ultrasound.    FOLLOW UP:  - Scheduled follow-up visit in 2 months.           1. Osteoarthritis of right knee, unspecified osteoarthritis type  -     Sedimentation Rate; Future; Expected date: 02/03/2025  -     CRP, High Sensitivity; Future; Expected date: 02/03/2025  -     Uric Acid; Future; Expected date: 02/03/2025  -     X-Ray Knee 1 or 2 View Right; Future; Expected date: 02/03/2025  -     Ambulatory referral/consult to Orthopedics; Future; Expected date: 02/10/2025    Other orders  -     diclofenac (VOLTAREN) 75 MG  EC tablet; Take 1 tablet (75 mg total) by mouth 2 (two) times daily.  Dispense: 30 tablet; Refill: 2            Mac Arrington MD  This note was generated with the assistance of ambient listening technology. Verbal consent was obtained by the patient and accompanying visitor(s) for the recording of patient appointment to facilitate this note. I attest to having reviewed and edited the generated note for accuracy, though some syntax or spelling errors may persist. Please contact the author of this note for any clarification.

## 2025-02-10 NOTE — PATIENT INSTRUCTIONS
[facility]  Instructions for surgery        Your surgery is scheduled for 3/6/25 at [facility] on the 1st floor of [facility].Your arrival time is 0600 a.m   You will be notified the day before  surgery verifying your arrival time for surgery.    Your preop lab work will be done 2/24/25. Lab is on the 1st floor of the clinic. EKG is on 2nd floor of the clinic.                                                                                                                                                                                                                                                                                                                                                                           Day of Surgery Instructions      Bring a list of all your medications with you the day of your surgery. You can also give the list to your doctor or nurse during your final clinic appointment before surgery.      Do not eat any solid foods or drink any liquids after 12:00 AM (midnight). This includes gum, hard candy, mints, and chewing tobacco.  Medications: Take any medications specified with a small sip of water the morning of your surgery.  Brush your teeth: You may brush your teeth and rinse your mouth. Do not swallow any water or toothpaste.  Clothing: A button front shirt and loose-fitting clothes are the most comfortable before and after surgery. We also recommend low-heeled shoes.  Hair: Avoid buns, ponytails, or hairpieces at the back of the head. Remove or avoid any clips, pins or bands that bind hair. Do not use hairspray. Before going to surgery, you will need to remove any wigs or hairpieces.  We will cover your hair during surgery. Your privacy regarding personal appearance will be respected.  Fingernails: Please be sure to remove all nail polish before you arrive for surgery. We understand that tips, wraps, gels, etc., are  expensive; however, we ask these products to be removed from at least one finger on each hand. Your fingertips are used to accurately monitor your oxygen level during surgery by a device called an oximeter.  Glasses and Contact Lenses: Wear glasses when possible. If contact lenses must be worn, bring a lens case and solution. If glasses are worn, bring a case for them.  Hearing Aids: If you rely on a hearing aid, wear it to the hospital on the day of surgery. This will ensure you can hear and understand everything we need to communicate with you.  Valuables: Jewelry, including body piercings, Dentures, money, and credit cards should be left at home. [facility] is not responsible for valuables that are not secured in our surgery center.  Makeup, Perfume, Creams, Lotions and Deodorants: Do not use any of these products on the day of surgery. Remove false eyelashes prior to surgery.  Implanted Medical Devices: If you have an implanted device, such as a pacemaker or AICD, bring the device information card (if you have it) with you.  Medical Equipment: If you have been fitted for a brace to wear after surgery or you have been given crutches, bring those with you to the surgery center.  Shower: Take a shower with Hibiclens® (chlorhexidine) (available over the counter). This reduces the chance of infection. PLEASE USE CHLORHEXIDINE WASH THE NIGHT BEFORE SURGERY AND THE MORNING OF SURGERY.  ONLY 2 VISITORS ARE ALLOWED WITH YOU ON DAY OF SURGERY.        Medication instructions:  You may take blood pressure medication with a small drink of water the morning of surgery.        IF YOU ARE ON ANY OF THESE BLOOD THINNERS, MAKE SURE YOUR PHYSICIAN IS AWARE.  Eliquis/Apixaban            Wafarin/Coumadin,Jantoven  Xarelto/Rivaroxaban      Pletal/Cilostazol  Plavix/Clopidogrel          Pradaxa/Dibigatran      If you are diabetic      Follow the diabetic medicine instructions you received during your pre-operative visit.  DO NOT take  your insulin or diabetic medications the morning of surgery.  When you arrive at the surgical center, be sure to tell the nurse you are diabetic.    The following blood sugar medications have to be stopped prior to surgery:    Hold 24 hours prior to surgery:    Libraglutide - Saxenda, Victoza  Lixisenatide --Adlxyin  Exenatide  --  Byetta  Empaglifozin--Jardiance  Sitaglitin--Januvia    Hold 1 week prior to surgery:    Semaglutide - Ozempic, Wegouy, Rybelsus  Dulaglutide - Trulicity  Tirzepatide - Mounjaro  Exenatide (extended release inj)-- Bydureon BCise      Hold 48 hours prior to surgery:    Metformin, Glucovance, Metaglip, Fortamet, Glucophage, Riomet, Avandamet, Glimepiride            Other Items to bring with you and know    Insurance card  Identification card such as 's license, passport, or other picture ID  Copy of your advance directives  List of medications and allergies, if not already provided  Name and phone number of person to contact if your condition changes significantly. YOU CANNOT DRIVE YOURSELF HOME FROM THE HOSPITAL THE DAY OF SURGERY.  PLEASE UNDERSTAND THAT OUR OFFICE DOES NOT GIVE PATHOLOGY RESULTS OR TEST RESULTS OVER THE PHONE. THIS WILL BE DISCUSSED WITH YOU ON YOUR FOLLOW UP APPOINTMENT.  IF YOU SUBMIT FMLA FORMS, IT WILL TAKE 3-7 DAYS TO COMPLETE THESE          Alcohol and Surgery  We want to help you prepare for and recover from surgery as quickly and safely as possible. Be open and honest with your provider about how many drinks you have per day. Excessive alcohol use is defined as drinking more than three drinks per day. It can affect the outcome of your surgery. Binge drinking (consuming large amounts of alcohol infrequently, such as on weekends) can also affect the outcome of your surgery.  Alcohol withdrawal  If you drink more than three drinks a day, you could have a complication, called alcohol withdrawal, after surgery.  Alcohol withdrawal is a set of symptoms that  people have when they suddenly stop drinking after using alcohol  for a long time. During withdrawal, a person's central nervous system overreacts. This can cause mild symptoms such as shakiness, sweating or hallucinating. It can also cause other more serious side effects. If not treated properly, alcohol withdrawal can cause potentially life-threatening complications after surgery. This can include tremors, seizures, hallucinations, delirium tremors, and even death. Untreated alcohol withdrawal often leads to a longer stay in the hospital, potentially in the Intensive Care Unit.  Chronic heavy drinking also can interfere with several organ systems and biochemical processes in the body.  This interference can cause serious, even life-threatening complications.  Your care team can offer alcohol withdrawal treatment to help:  Decrease the risk of seizures and delirium tremors after surgery  Decrease the risk we will need to restrain you for your own safety or the safety of others  Decrease your risk of falling after surgery  Reduce the use of potent sedative medications  Reduce the time you stay in the hospital after surgery  Reduce the time you might spend on a mechanical ventilator to help you breathe  Lower incidence of organ failure and biochemical complications  Talk to a member of your care team or your primary care physician about your alcohol use if you feel you may be at risk of any of these complications.        Smoking and Surgery  Quitting smoking is extremely important for a successful surgery and recovery. Cigarette smoking compromises your immune system. This increases your risk of an infection after surgery. Quitting the habit before surgery will decrease the surgical risks associated with smoking.

## 2025-02-11 PROBLEM — K40.90 INGUINAL HERNIA OF LEFT SIDE WITHOUT OBSTRUCTION OR GANGRENE: Status: ACTIVE | Noted: 2025-02-11

## 2025-02-11 NOTE — PROGRESS NOTES
"Subjective:           Patient ID: Zachariah Aguilar is a 55 y.o. male.    Chief Complaint: Consult (Consult for ing. Hernia )      56 y/o male with history of left groin bulge for awhile.  He has increasing discomfort, left groin.  Underwent CT and u/s recently.  Determined to have omentum in scrotum and bladder tented into left inguinal hernia, as well.  Patient does state that he will have to urinate after pushing on the bulge. He is here to schedule repair.   Past surgical history consistent with inguinal hernia repair as a child, though he does not recall laterality.     family history is not on file.    History reviewed. No pertinent past medical history.     Current Outpatient Medications on File Prior to Visit   Medication Sig Dispense Refill    diclofenac (VOLTAREN) 75 MG EC tablet Take 1 tablet (75 mg total) by mouth 2 (two) times daily. 30 tablet 2    predniSONE (DELTASONE) 10 MG tablet Take 2 po daily for 2 days. Then, 1 po daily until gone. 7 tablet 0    tamsulosin (FLOMAX) 0.4 mg Cap Take 1 capsule (0.4 mg total) by mouth once daily. 90 capsule 1     No current facility-administered medications on file prior to visit.       Review of patient's allergies indicates:  No Known Allergies    Past Surgical History:   Procedure Laterality Date    ABLATION, CHEMICAL SEALANT, VARICOSE VEIN Right 11/21/2024    Right GSV Venaseal Ablation performed by Dr. Rahul Gooden    ABLATION, CHEMICAL SEALANT, VARICOSE VEIN Left 12/05/2024    Left GSV Venaseal Ablation performed by Dr. Rahul Gooden    ABLATION, CHEMICAL SEALANT, VARICOSE VEIN Left 12/12/2024    Left SSV Venaseal Ablation performed by Dr. Rahul Gooden         reports that he has never smoked. He does not have any smokeless tobacco history on file. He reports that he does not drink alcohol and does not use drugs.       Review of Systems   All other systems reviewed and are negative.           Objective:      Ht 5' 10" (1.778 m)   BMI 35.01 kg/m²  "   Physical Exam  Constitutional:       General: He is not in acute distress.     Appearance: He is normal weight.   HENT:      Nose: Nose normal.   Eyes:      General: No scleral icterus.  Cardiovascular:      Rate and Rhythm: Normal rate and regular rhythm.      Pulses: Normal pulses.      Heart sounds: Normal heart sounds.   Pulmonary:      Breath sounds: Normal breath sounds.   Abdominal:      General: There is no distension.      Palpations: Abdomen is soft. There is no mass.      Hernia: A hernia (bulge left groin, extendinig into scrotum, worse with valsalva) is present.   Musculoskeletal:         General: No swelling or tenderness.   Lymphadenopathy:      Cervical: No cervical adenopathy.   Neurological:      General: No focal deficit present.      Mental Status: He is alert.      Motor: No weakness.   Psychiatric:         Mood and Affect: Mood normal.       Assessment/Plan:     Problem List Items Addressed This Visit          GI    Inguinal hernia of left side without obstruction or gangrene    Overview     Left side, scrotal component         Current Assessment & Plan     Risks and benefits of surgery were discussed including infection, bleeding, recurrence, neuralgia, mesh problems, injury to the cord and cord structures which could result in testicular atrophy, and the unforeseen. He understands wish to proceed.           Other Visit Diagnoses       Inguinal hernia without obstruction or gangrene, recurrence not specified, unspecified laterality    -  Primary    Relevant Orders    CBC Auto Differential    Basic Metabolic Panel    EKG 12-lead    Case Request Operating Room: REPAIR, HERNIA, INGUINAL (Completed)    Pre-procedural examination        Relevant Orders    CBC Auto Differential    Basic Metabolic Panel    EKG 12-lead    Case Request Operating Room: REPAIR, HERNIA, INGUINAL (Completed)

## 2025-02-11 NOTE — ASSESSMENT & PLAN NOTE
Risks and benefits of surgery were discussed including infection, bleeding, recurrence, neuralgia, mesh problems, injury to the cord and cord structures which could result in testicular atrophy, and the unforeseen. He understands wish to proceed.

## 2025-02-11 NOTE — H&P (VIEW-ONLY)
"Subjective:           Patient ID: Zachariah Aguilar is a 55 y.o. male.    Chief Complaint: Consult (Consult for ing. Hernia )      54 y/o male with history of left groin bulge for awhile.  He has increasing discomfort, left groin.  Underwent CT and u/s recently.  Determined to have omentum in scrotum and bladder tented into left inguinal hernia, as well.  Patient does state that he will have to urinate after pushing on the bulge. He is here to schedule repair.   Past surgical history consistent with inguinal hernia repair as a child, though he does not recall laterality.     family history is not on file.    History reviewed. No pertinent past medical history.     Current Outpatient Medications on File Prior to Visit   Medication Sig Dispense Refill    diclofenac (VOLTAREN) 75 MG EC tablet Take 1 tablet (75 mg total) by mouth 2 (two) times daily. 30 tablet 2    predniSONE (DELTASONE) 10 MG tablet Take 2 po daily for 2 days. Then, 1 po daily until gone. 7 tablet 0    tamsulosin (FLOMAX) 0.4 mg Cap Take 1 capsule (0.4 mg total) by mouth once daily. 90 capsule 1     No current facility-administered medications on file prior to visit.       Review of patient's allergies indicates:  No Known Allergies    Past Surgical History:   Procedure Laterality Date    ABLATION, CHEMICAL SEALANT, VARICOSE VEIN Right 11/21/2024    Right GSV Venaseal Ablation performed by Dr. Rahul Gooden    ABLATION, CHEMICAL SEALANT, VARICOSE VEIN Left 12/05/2024    Left GSV Venaseal Ablation performed by Dr. Rahul Gooden    ABLATION, CHEMICAL SEALANT, VARICOSE VEIN Left 12/12/2024    Left SSV Venaseal Ablation performed by Dr. Rahul Gooden         reports that he has never smoked. He does not have any smokeless tobacco history on file. He reports that he does not drink alcohol and does not use drugs.       Review of Systems   All other systems reviewed and are negative.           Objective:      Ht 5' 10" (1.778 m)   BMI 35.01 kg/m²  "   Physical Exam  Constitutional:       General: He is not in acute distress.     Appearance: He is normal weight.   HENT:      Nose: Nose normal.   Eyes:      General: No scleral icterus.  Cardiovascular:      Rate and Rhythm: Normal rate and regular rhythm.      Pulses: Normal pulses.      Heart sounds: Normal heart sounds.   Pulmonary:      Breath sounds: Normal breath sounds.   Abdominal:      General: There is no distension.      Palpations: Abdomen is soft. There is no mass.      Hernia: A hernia (bulge left groin, extendinig into scrotum, worse with valsalva) is present.   Musculoskeletal:         General: No swelling or tenderness.   Lymphadenopathy:      Cervical: No cervical adenopathy.   Neurological:      General: No focal deficit present.      Mental Status: He is alert.      Motor: No weakness.   Psychiatric:         Mood and Affect: Mood normal.       Assessment/Plan:     Problem List Items Addressed This Visit          GI    Inguinal hernia of left side without obstruction or gangrene    Overview     Left side, scrotal component         Current Assessment & Plan     Risks and benefits of surgery were discussed including infection, bleeding, recurrence, neuralgia, mesh problems, injury to the cord and cord structures which could result in testicular atrophy, and the unforeseen. He understands wish to proceed.           Other Visit Diagnoses       Inguinal hernia without obstruction or gangrene, recurrence not specified, unspecified laterality    -  Primary    Relevant Orders    CBC Auto Differential    Basic Metabolic Panel    EKG 12-lead    Case Request Operating Room: REPAIR, HERNIA, INGUINAL (Completed)    Pre-procedural examination        Relevant Orders    CBC Auto Differential    Basic Metabolic Panel    EKG 12-lead    Case Request Operating Room: REPAIR, HERNIA, INGUINAL (Completed)

## 2025-02-17 ENCOUNTER — TELEPHONE (OUTPATIENT)
Dept: ORTHOPEDICS | Facility: CLINIC | Age: 55
End: 2025-02-17
Payer: COMMERCIAL

## 2025-02-24 ENCOUNTER — OFFICE VISIT (OUTPATIENT)
Dept: ORTHOPEDICS | Facility: CLINIC | Age: 55
End: 2025-02-24
Payer: COMMERCIAL

## 2025-02-24 ENCOUNTER — HOSPITAL ENCOUNTER (OUTPATIENT)
Dept: RADIOLOGY | Facility: HOSPITAL | Age: 55
Discharge: HOME | End: 2025-02-24
Payer: COMMERCIAL

## 2025-02-24 DIAGNOSIS — M17.11 OSTEOARTHRITIS OF RIGHT KNEE, UNSPECIFIED OSTEOARTHRITIS TYPE: ICD-10-CM

## 2025-02-24 PROCEDURE — 99213 OFFICE O/P EST LOW 20 MIN: CPT | Mod: PBBFAC,25

## 2025-02-24 PROCEDURE — 99999 PR PBB SHADOW E&M-EST. PATIENT-LVL III: CPT | Mod: PBBFAC,,,

## 2025-02-24 PROCEDURE — 73564 X-RAY EXAM KNEE 4 OR MORE: CPT | Mod: TC,RT

## 2025-02-24 PROCEDURE — 99203 OFFICE O/P NEW LOW 30 MIN: CPT | Mod: S$PBB,,,

## 2025-02-24 RX ORDER — MELOXICAM 15 MG/1
15 TABLET ORAL DAILY
Qty: 30 TABLET | Refills: 2 | Status: SHIPPED | OUTPATIENT
Start: 2025-02-24 | End: 2025-05-25

## 2025-02-26 NOTE — PROGRESS NOTES
CC:   Chief Complaint   Patient presents with    Right Knee - Pain          Zachariah Aguilar is a 55 y.o. male seen today for Pain of the Right Knee  Patient presents today with complaints of chronic right knee pain.  He reports intermittent knee pain and swelling over the past several months.  He went to see family Medicine on 02/03/2025 where x-rays showed moderate changes, no acute fracture or dislocation.  Uric acid level within normal limits.  Patient was given ibuprofen for pain.  Patient presents today reporting that his right knee pain and swelling has improved over the last week.  Denies any fall or injury the onset of his symptoms.  No other complaints today.        PAST MEDICAL HISTORY: No past medical history on file.       PAST SURGICAL HISTORY:   Past Surgical History:   Procedure Laterality Date    ABLATION, CHEMICAL SEALANT, VARICOSE VEIN Right 11/21/2024    Right GSV Venaseal Ablation performed by Dr. Rahul Gooden    ABLATION, CHEMICAL SEALANT, VARICOSE VEIN Left 12/05/2024    Left GSV Venaseal Ablation performed by Dr. Rahul Gooden    ABLATION, CHEMICAL SEALANT, VARICOSE VEIN Left 12/12/2024    Left SSV Venaseal Ablation performed by Dr. Rahul Gooden          ALLERGIES: Review of patient's allergies indicates:  No Known Allergies     MEDICATIONS :  Current Medications[1]     SOCIAL HISTORY: Social History[2]     FAMILY HISTORY: No family history on file.       PHYSICAL EXAM:      There were no vitals filed for this visit.  There is no height or weight on file to calculate BMI.    GENERAL: Well-developed, well-nourished male . The patient is alert, oriented and cooperative.    HEENT:  Normocephalic, atraumatic.  Extraocular movements are intact bilaterally.     NECK:  Nontender with good range of motion.    LUNGS:  Clear to auscultation bilaterally.    HEART:  Regular rate and rhythm.     ABDOMEN:  Soft, non-tender, non-distended.      EXTREMITIES:  Right knee skin clean dry and  intact, no joint effusion appreciated on exam today, range of motion from 0-120 degrees, there is crepitus over patella with movement, neurovascularly intact      RADIOGRAPHIC FINDINGS:   X-Ray Knee Complete 4 Or More Views Right  Result Date: 2/25/2025  EXAMINATION: XR KNEE COMP 4 OR MORE VIEWS RIGHT CLINICAL HISTORY: Unilateral primary osteoarthritis, right knee TECHNIQUE: Four views of the right knee COMPARISON: 02/03/2025 FINDINGS: Very mild degenerative changes.  2 mm calcific density projecting along the lateral aspect of the medial femoral condyle at the intercondylar notch on 1 of the views, apparent PA flexion view although not labeled, possibly spur or small peritendinous calcification or even loose body.  Not seen on prior images but without comparable projection on the prior study.  Small joint effusion.  Acute fracture or dislocation is not seen     As above Electronically signed by: Sima Gant MD Date:    02/25/2025 Time:    09:23    X-Ray Knee 1 or 2 View Right  Result Date: 2/4/2025  EXAMINATION: XR KNEE 1 OR 2 VIEW RIGHT CLINICAL HISTORY: Unilateral primary osteoarthritis, right knee TECHNIQUE: AP and lateral views of the right knee were performed. COMPARISON: 09/16/2024 FINDINGS: No fracture, dislocation, or joint effusion.  No significant degenerative change.  Soft tissues are unremarkable.     No significant right knee pathology. Electronically signed by: Ramu Zaragoza MD Date:    02/04/2025 Time:    13:33       Patient Active Problem List    Diagnosis Date Noted    Inguinal hernia of left side without obstruction or gangrene 02/11/2025    Venous insufficiency 10/24/2024    Stasis edema with ulcer, bilateral 10/24/2024    Edema, lower extremity 09/25/2024    Hyperpigmentation of skin 09/25/2024    Pain in both knees 09/25/2024    Corns and callosities 07/27/2024     IMPRESSION AND PLAN:  Chronic right knee pain.  Personally reviewed repeat x-rays today showing mild tricompartmental  degenerative changes, no acute fracture or dislocation.  Discussed all treatment options with the patient today.  Patient reports his knee pain has improving.  Discussed continuation of anti-inflammatories, Mobic prescribe today.  Follow up with the orthopedic clinic p.r.n..    No follow-ups on file.       Sujatha Tellez PA-C      (Subject to voice recognition error, transcription service not allowed)           [1]   Current Outpatient Medications:     meloxicam (MOBIC) 15 MG tablet, Take 1 tablet (15 mg total) by mouth once daily., Disp: 30 tablet, Rfl: 2    predniSONE (DELTASONE) 10 MG tablet, Take 2 po daily for 2 days. Then, 1 po daily until gone., Disp: 7 tablet, Rfl: 0    tamsulosin (FLOMAX) 0.4 mg Cap, Take 1 capsule (0.4 mg total) by mouth once daily., Disp: 90 capsule, Rfl: 1  [2]   Social History  Socioeconomic History    Marital status:    Tobacco Use    Smoking status: Never   Substance and Sexual Activity    Alcohol use: Never    Drug use: Never    Sexual activity: Not Currently

## 2025-03-06 ENCOUNTER — ANESTHESIA EVENT (OUTPATIENT)
Dept: SURGERY | Facility: HOSPITAL | Age: 55
End: 2025-03-06
Payer: COMMERCIAL

## 2025-03-06 ENCOUNTER — ANESTHESIA (OUTPATIENT)
Dept: SURGERY | Facility: HOSPITAL | Age: 55
End: 2025-03-06
Payer: COMMERCIAL

## 2025-03-06 ENCOUNTER — HOSPITAL ENCOUNTER (OUTPATIENT)
Facility: HOSPITAL | Age: 55
Discharge: HOME | End: 2025-03-06
Attending: SURGERY | Admitting: SURGERY
Payer: COMMERCIAL

## 2025-03-06 VITALS
TEMPERATURE: 98 F | HEART RATE: 82 BPM | BODY MASS INDEX: 34.93 KG/M2 | DIASTOLIC BLOOD PRESSURE: 72 MMHG | HEIGHT: 70 IN | WEIGHT: 244 LBS | SYSTOLIC BLOOD PRESSURE: 130 MMHG | RESPIRATION RATE: 12 BRPM | OXYGEN SATURATION: 95 %

## 2025-03-06 DIAGNOSIS — Z01.818 PREOPERATIVE CLEARANCE: ICD-10-CM

## 2025-03-06 DIAGNOSIS — K40.90 INGUINAL HERNIA OF LEFT SIDE WITHOUT OBSTRUCTION OR GANGRENE: Primary | ICD-10-CM

## 2025-03-06 LAB
ANION GAP SERPL CALCULATED.3IONS-SCNC: 10 MMOL/L (ref 7–16)
BASOPHILS # BLD AUTO: 0.03 K/UL (ref 0–0.2)
BASOPHILS NFR BLD AUTO: 0.7 % (ref 0–1)
BUN SERPL-MCNC: 11 MG/DL (ref 8–26)
BUN/CREAT SERPL: 13 (ref 6–20)
CALCIUM SERPL-MCNC: 9.3 MG/DL (ref 8.4–10.2)
CHLORIDE SERPL-SCNC: 103 MMOL/L (ref 98–107)
CO2 SERPL-SCNC: 26 MMOL/L (ref 22–29)
CREAT SERPL-MCNC: 0.86 MG/DL (ref 0.72–1.25)
DIFFERENTIAL METHOD BLD: ABNORMAL
EGFR (NO RACE VARIABLE) (RUSH/TITUS): 102 ML/MIN/1.73M2
EOSINOPHIL # BLD AUTO: 0.02 K/UL (ref 0–0.5)
EOSINOPHIL NFR BLD AUTO: 0.5 % (ref 1–4)
ERYTHROCYTE [DISTWIDTH] IN BLOOD BY AUTOMATED COUNT: 14 % (ref 11.5–14.5)
GLUCOSE SERPL-MCNC: 93 MG/DL (ref 74–100)
HCT VFR BLD AUTO: 43.7 % (ref 40–54)
HGB BLD-MCNC: 14.2 G/DL (ref 13.5–18)
IMM GRANULOCYTES # BLD AUTO: 0.01 K/UL (ref 0–0.04)
IMM GRANULOCYTES NFR BLD: 0.2 % (ref 0–0.4)
LYMPHOCYTES # BLD AUTO: 1.44 K/UL (ref 1–4.8)
LYMPHOCYTES NFR BLD AUTO: 34 % (ref 27–41)
MCH RBC QN AUTO: 28.4 PG (ref 27–31)
MCHC RBC AUTO-ENTMCNC: 32.5 G/DL (ref 32–36)
MCV RBC AUTO: 87.4 FL (ref 80–96)
MONOCYTES # BLD AUTO: 0.42 K/UL (ref 0–0.8)
MONOCYTES NFR BLD AUTO: 9.9 % (ref 2–6)
MPC BLD CALC-MCNC: 9.7 FL (ref 9.4–12.4)
NEUTROPHILS # BLD AUTO: 2.32 K/UL (ref 1.8–7.7)
NEUTROPHILS NFR BLD AUTO: 54.7 % (ref 53–65)
NRBC # BLD AUTO: 0 X10E3/UL
NRBC, AUTO (.00): 0 %
OHS QRS DURATION: 84 MS
OHS QTC CALCULATION: 416 MS
PLATELET # BLD AUTO: 274 K/UL (ref 150–400)
POTASSIUM SERPL-SCNC: 4.3 MMOL/L (ref 3.5–5.1)
RBC # BLD AUTO: 5 M/UL (ref 4.6–6.2)
SODIUM SERPL-SCNC: 135 MMOL/L (ref 136–145)
WBC # BLD AUTO: 4.24 K/UL (ref 4.5–11)

## 2025-03-06 PROCEDURE — 63600175 PHARM REV CODE 636 W HCPCS: Mod: JZ,TB

## 2025-03-06 PROCEDURE — 36000707: Performed by: SURGERY

## 2025-03-06 PROCEDURE — 85025 COMPLETE CBC W/AUTO DIFF WBC: CPT | Performed by: SURGERY

## 2025-03-06 PROCEDURE — 27000689 HC BLADE LARYNGOSCOPE ANY SIZE: Performed by: ANESTHESIOLOGY

## 2025-03-06 PROCEDURE — 27000716 HC OXISENSOR PROBE, ANY SIZE: Performed by: ANESTHESIOLOGY

## 2025-03-06 PROCEDURE — 71000016 HC POSTOP RECOV ADDL HR: Performed by: SURGERY

## 2025-03-06 PROCEDURE — 49505 PRP I/HERN INIT REDUC >5 YR: CPT | Mod: LT,,, | Performed by: SURGERY

## 2025-03-06 PROCEDURE — 27000165 HC TUBE, ETT CUFFED: Performed by: ANESTHESIOLOGY

## 2025-03-06 PROCEDURE — 80048 BASIC METABOLIC PNL TOTAL CA: CPT | Performed by: SURGERY

## 2025-03-06 PROCEDURE — 36415 COLL VENOUS BLD VENIPUNCTURE: CPT | Mod: 59 | Performed by: SURGERY

## 2025-03-06 PROCEDURE — 93005 ELECTROCARDIOGRAM TRACING: CPT

## 2025-03-06 PROCEDURE — 37000009 HC ANESTHESIA EA ADD 15 MINS: Performed by: SURGERY

## 2025-03-06 PROCEDURE — 27000510 HC BLANKET BAIR HUGGER ANY SIZE: Performed by: ANESTHESIOLOGY

## 2025-03-06 PROCEDURE — 37000008 HC ANESTHESIA 1ST 15 MINUTES: Performed by: SURGERY

## 2025-03-06 PROCEDURE — 71000033 HC RECOVERY, INTIAL HOUR: Performed by: SURGERY

## 2025-03-06 PROCEDURE — 25000003 PHARM REV CODE 250

## 2025-03-06 PROCEDURE — C1781 MESH (IMPLANTABLE): HCPCS | Performed by: SURGERY

## 2025-03-06 PROCEDURE — 36000706: Performed by: SURGERY

## 2025-03-06 PROCEDURE — 71000015 HC POSTOP RECOV 1ST HR: Performed by: SURGERY

## 2025-03-06 PROCEDURE — 93010 ELECTROCARDIOGRAM REPORT: CPT | Mod: ,,, | Performed by: INTERNAL MEDICINE

## 2025-03-06 PROCEDURE — 27000655: Performed by: ANESTHESIOLOGY

## 2025-03-06 PROCEDURE — 63600175 PHARM REV CODE 636 W HCPCS: Performed by: SURGERY

## 2025-03-06 DEVICE — PERFIX PLUG, 1.6" X 1.9" (4.1 CM X 4.8 CM), LARGE (CONTENTS: 2)
Type: IMPLANTABLE DEVICE | Site: GROIN | Status: FUNCTIONAL
Brand: PERFIX

## 2025-03-06 RX ORDER — DEXAMETHASONE SODIUM PHOSPHATE 4 MG/ML
INJECTION, SOLUTION INTRA-ARTICULAR; INTRALESIONAL; INTRAMUSCULAR; INTRAVENOUS; SOFT TISSUE
Status: DISCONTINUED | OUTPATIENT
Start: 2025-03-06 | End: 2025-03-06

## 2025-03-06 RX ORDER — ONDANSETRON 4 MG/1
8 TABLET, ORALLY DISINTEGRATING ORAL EVERY 8 HOURS PRN
Status: DISCONTINUED | OUTPATIENT
Start: 2025-03-06 | End: 2025-03-06 | Stop reason: HOSPADM

## 2025-03-06 RX ORDER — HYDROCODONE BITARTRATE AND ACETAMINOPHEN 7.5; 325 MG/1; MG/1
1 TABLET ORAL EVERY 6 HOURS PRN
Qty: 24 TABLET | Refills: 0 | Status: SHIPPED | OUTPATIENT
Start: 2025-03-06 | End: 2025-03-12

## 2025-03-06 RX ORDER — HYDROCODONE BITARTRATE AND ACETAMINOPHEN 10; 325 MG/1; MG/1
1 TABLET ORAL EVERY 4 HOURS PRN
Refills: 0 | Status: DISCONTINUED | OUTPATIENT
Start: 2025-03-06 | End: 2025-03-06 | Stop reason: HOSPADM

## 2025-03-06 RX ORDER — HYDROMORPHONE HYDROCHLORIDE 2 MG/ML
INJECTION, SOLUTION INTRAMUSCULAR; INTRAVENOUS; SUBCUTANEOUS
Status: DISCONTINUED | OUTPATIENT
Start: 2025-03-06 | End: 2025-03-06

## 2025-03-06 RX ORDER — MORPHINE SULFATE 10 MG/ML
4 INJECTION INTRAMUSCULAR; INTRAVENOUS; SUBCUTANEOUS EVERY 5 MIN PRN
Status: DISCONTINUED | OUTPATIENT
Start: 2025-03-06 | End: 2025-03-06 | Stop reason: HOSPADM

## 2025-03-06 RX ORDER — HYDROMORPHONE HYDROCHLORIDE 2 MG/ML
0.5 INJECTION, SOLUTION INTRAMUSCULAR; INTRAVENOUS; SUBCUTANEOUS EVERY 5 MIN PRN
Status: DISCONTINUED | OUTPATIENT
Start: 2025-03-06 | End: 2025-03-06 | Stop reason: HOSPADM

## 2025-03-06 RX ORDER — BUPIVACAINE HYDROCHLORIDE 2.5 MG/ML
INJECTION, SOLUTION EPIDURAL; INFILTRATION; INTRACAUDAL; PERINEURAL
Status: DISCONTINUED | OUTPATIENT
Start: 2025-03-06 | End: 2025-03-06 | Stop reason: HOSPADM

## 2025-03-06 RX ORDER — MEPERIDINE HYDROCHLORIDE 25 MG/ML
25 INJECTION INTRAMUSCULAR; INTRAVENOUS; SUBCUTANEOUS EVERY 10 MIN PRN
Status: DISCONTINUED | OUTPATIENT
Start: 2025-03-06 | End: 2025-03-06 | Stop reason: HOSPADM

## 2025-03-06 RX ORDER — PHENYLEPHRINE HYDROCHLORIDE 10 MG/ML
INJECTION INTRAVENOUS
Status: DISCONTINUED | OUTPATIENT
Start: 2025-03-06 | End: 2025-03-06

## 2025-03-06 RX ORDER — MIDAZOLAM HYDROCHLORIDE 1 MG/ML
INJECTION INTRAMUSCULAR; INTRAVENOUS
Status: DISCONTINUED | OUTPATIENT
Start: 2025-03-06 | End: 2025-03-06

## 2025-03-06 RX ORDER — DIPHENHYDRAMINE HYDROCHLORIDE 50 MG/ML
25 INJECTION, SOLUTION INTRAMUSCULAR; INTRAVENOUS EVERY 6 HOURS PRN
Status: DISCONTINUED | OUTPATIENT
Start: 2025-03-06 | End: 2025-03-06 | Stop reason: HOSPADM

## 2025-03-06 RX ORDER — ACETAMINOPHEN 10 MG/ML
1000 INJECTION, SOLUTION INTRAVENOUS ONCE
Status: DISCONTINUED | OUTPATIENT
Start: 2025-03-06 | End: 2025-03-06 | Stop reason: HOSPADM

## 2025-03-06 RX ORDER — ONDANSETRON HYDROCHLORIDE 2 MG/ML
INJECTION, SOLUTION INTRAVENOUS
Status: DISCONTINUED | OUTPATIENT
Start: 2025-03-06 | End: 2025-03-06

## 2025-03-06 RX ORDER — IBUPROFEN 600 MG/1
600 TABLET ORAL EVERY 6 HOURS PRN
Status: DISCONTINUED | OUTPATIENT
Start: 2025-03-06 | End: 2025-03-06 | Stop reason: HOSPADM

## 2025-03-06 RX ORDER — ACETAMINOPHEN 10 MG/ML
INJECTION, SOLUTION INTRAVENOUS
Status: DISCONTINUED | OUTPATIENT
Start: 2025-03-06 | End: 2025-03-06

## 2025-03-06 RX ORDER — ONDANSETRON HYDROCHLORIDE 2 MG/ML
4 INJECTION, SOLUTION INTRAVENOUS DAILY PRN
Status: DISCONTINUED | OUTPATIENT
Start: 2025-03-06 | End: 2025-03-06 | Stop reason: HOSPADM

## 2025-03-06 RX ORDER — FENTANYL CITRATE 50 UG/ML
INJECTION, SOLUTION INTRAMUSCULAR; INTRAVENOUS
Status: DISCONTINUED | OUTPATIENT
Start: 2025-03-06 | End: 2025-03-06

## 2025-03-06 RX ORDER — ROCURONIUM BROMIDE 10 MG/ML
INJECTION, SOLUTION INTRAVENOUS
Status: DISCONTINUED | OUTPATIENT
Start: 2025-03-06 | End: 2025-03-06

## 2025-03-06 RX ORDER — PROPOFOL 10 MG/ML
VIAL (ML) INTRAVENOUS
Status: DISCONTINUED | OUTPATIENT
Start: 2025-03-06 | End: 2025-03-06

## 2025-03-06 RX ORDER — LIDOCAINE HYDROCHLORIDE 20 MG/ML
INJECTION, SOLUTION EPIDURAL; INFILTRATION; INTRACAUDAL; PERINEURAL
Status: DISCONTINUED | OUTPATIENT
Start: 2025-03-06 | End: 2025-03-06

## 2025-03-06 RX ORDER — KETOROLAC TROMETHAMINE 30 MG/ML
INJECTION, SOLUTION INTRAMUSCULAR; INTRAVENOUS
Status: DISCONTINUED | OUTPATIENT
Start: 2025-03-06 | End: 2025-03-06

## 2025-03-06 RX ORDER — IPRATROPIUM BROMIDE AND ALBUTEROL SULFATE 2.5; .5 MG/3ML; MG/3ML
3 SOLUTION RESPIRATORY (INHALATION) ONCE
Status: DISCONTINUED | OUTPATIENT
Start: 2025-03-06 | End: 2025-03-06 | Stop reason: HOSPADM

## 2025-03-06 RX ORDER — MORPHINE SULFATE 10 MG/ML
4 INJECTION INTRAMUSCULAR; INTRAVENOUS; SUBCUTANEOUS ONCE
Refills: 0 | Status: DISCONTINUED | OUTPATIENT
Start: 2025-03-06 | End: 2025-03-06 | Stop reason: HOSPADM

## 2025-03-06 RX ADMIN — SUGAMMADEX 200 MG: 100 INJECTION, SOLUTION INTRAVENOUS at 02:03

## 2025-03-06 RX ADMIN — HYDROMORPHONE HYDROCHLORIDE 1.2 MG: 2 INJECTION, SOLUTION INTRAMUSCULAR; INTRAVENOUS; SUBCUTANEOUS at 12:03

## 2025-03-06 RX ADMIN — HYDROMORPHONE HYDROCHLORIDE 0.8 MG: 2 INJECTION, SOLUTION INTRAMUSCULAR; INTRAVENOUS; SUBCUTANEOUS at 01:03

## 2025-03-06 RX ADMIN — SODIUM CHLORIDE 2 G: 9 INJECTION, SOLUTION INTRAVENOUS at 12:03

## 2025-03-06 RX ADMIN — KETOROLAC TROMETHAMINE 30 MG: 30 INJECTION, SOLUTION INTRAMUSCULAR at 01:03

## 2025-03-06 RX ADMIN — MIDAZOLAM HYDROCHLORIDE 2 MG: 1 INJECTION, SOLUTION INTRAMUSCULAR; INTRAVENOUS at 12:03

## 2025-03-06 RX ADMIN — DEXAMETHASONE SODIUM PHOSPHATE 8 MG: 4 INJECTION, SOLUTION INTRA-ARTICULAR; INTRALESIONAL; INTRAMUSCULAR; INTRAVENOUS; SOFT TISSUE at 12:03

## 2025-03-06 RX ADMIN — PHENYLEPHRINE HYDROCHLORIDE 100 MCG: 10 INJECTION INTRAVENOUS at 12:03

## 2025-03-06 RX ADMIN — FENTANYL CITRATE 100 MCG: 50 INJECTION, SOLUTION INTRAMUSCULAR; INTRAVENOUS at 12:03

## 2025-03-06 RX ADMIN — ACETAMINOPHEN 1000 MG: 10 INJECTION, SOLUTION INTRAVENOUS at 12:03

## 2025-03-06 RX ADMIN — ONDANSETRON 4 MG: 2 INJECTION INTRAMUSCULAR; INTRAVENOUS at 12:03

## 2025-03-06 RX ADMIN — ROCURONIUM BROMIDE 20 MG: 10 INJECTION, SOLUTION INTRAVENOUS at 12:03

## 2025-03-06 RX ADMIN — ROCURONIUM BROMIDE 50 MG: 10 INJECTION, SOLUTION INTRAVENOUS at 12:03

## 2025-03-06 RX ADMIN — ROCURONIUM BROMIDE 10 MG: 10 INJECTION, SOLUTION INTRAVENOUS at 01:03

## 2025-03-06 RX ADMIN — SODIUM CHLORIDE: 9 INJECTION, SOLUTION INTRAVENOUS at 01:03

## 2025-03-06 RX ADMIN — LIDOCAINE HYDROCHLORIDE 100 MG: 20 INJECTION, SOLUTION EPIDURAL; INFILTRATION; INTRACAUDAL; PERINEURAL at 12:03

## 2025-03-06 RX ADMIN — SODIUM CHLORIDE: 9 INJECTION, SOLUTION INTRAVENOUS at 12:03

## 2025-03-06 RX ADMIN — PROPOFOL 200 MG: 10 INJECTION, EMULSION INTRAVENOUS at 12:03

## 2025-03-06 NOTE — ANESTHESIA PREPROCEDURE EVALUATION
03/06/2025  Zachariah Aguilar is a 55 y.o., male.      Pre-op Assessment    I have reviewed the Patient Summary Reports.     I have reviewed the Nursing Notes. I have reviewed the NPO Status.   I have reviewed the Medications.     Review of Systems  Anesthesia Hx:             Denies Family Hx of Anesthesia complications.    Denies Personal Hx of Anesthesia complications.                    Social:  Non-Smoker, No Alcohol Use       Renal/:    BPH                  Physical Exam  General: Well nourished, Cooperative, Alert and Oriented    Airway:  Mallampati: II / II  Mouth Opening: Normal  TM Distance: Normal  Neck ROM: Normal ROM    Dental:  Intact    Chest/Lungs:  Clear to auscultation    Heart:  Rate: Normal  Rhythm: Regular Rhythm  Sounds: Normal        Chemistry        Component Value Date/Time     01/13/2025 1700    K 4.4 01/13/2025 1700     01/13/2025 1700    CO2 22 01/13/2025 1700    BUN 13 01/13/2025 1700    CREATININE 1.13 01/13/2025 1700    GLU 79 01/13/2025 1700        Component Value Date/Time    CALCIUM 9.3 01/13/2025 1700    ALKPHOS 85 07/27/2024 1731    AST 30 07/27/2024 1731    ALT 40 07/27/2024 1731    BILITOT 0.4 07/27/2024 1731        Lab Results   Component Value Date    WBC 4.24 (L) 03/06/2025    HGB 14.2 03/06/2025    HCT 43.7 03/06/2025     03/06/2025     No results found for this or any previous visit.      Anesthesia Plan  Type of Anesthesia, risks & benefits discussed:    Anesthesia Type: Gen ETT  Intra-op Monitoring Plan: Standard ASA Monitors  Post Op Pain Control Plan: multimodal analgesia  Induction:  IV  Airway Plan: Direct  Informed Consent: Informed consent signed with the Patient and all parties understand the risks and agree with anesthesia plan.  All questions answered.   ASA Score: 1  Day of Surgery Review of History & Physical: H&P Update referred to  the surgeon/provider.I have interviewed and examined the patient. I have reviewed the patient's H&P dated: There are no significant changes.     Ready For Surgery From Anesthesia Perspective.     .

## 2025-03-06 NOTE — PLAN OF CARE
1420: Pt back from surgery on 10L SMF with R nasal trumpet . VSS, Neuro intact. Pt responds to voice. L grion Dressing C/D/I.Pt has no CC at this time    1438: Notified Family Via chart text     1425: pt placed on 3L NC, nasal trumpet rm'd pt drowsy. VSS.      1456: Spoke with outpatient about transfer    1510: Pt transferred to outpatient. VSS, O2 96% on 3L NC  HR 87,  /99,  RR 12 . Neuro intact. L grion dressing C/D/I.  Family at bedside. Report given to ORVILLE Anguiano . Care transferred at this time.

## 2025-03-06 NOTE — TRANSFER OF CARE
"Anesthesia Transfer of Care Note    Patient: Zachariah Aguilar    Procedure(s) Performed: Procedure(s) (LRB):  REPAIR, HERNIA, INGUINAL (Left)    Patient location: PACU    Anesthesia Type: MAC    Transport from OR: Transported from OR on 6-10 L/min O2 by face mask with adequate spontaneous ventilation    Post pain: adequate analgesia    Post assessment: tolerated procedure well and no apparent anesthetic complications    Post vital signs: stable    Level of consciousness: responds to stimulation    Nausea/Vomiting: no nausea/vomiting    Complications: none    Transfer of care protocol was followed      Last vitals: Visit Vitals  /81   Pulse 90   Temp 36.4 °C (97.6 °F)   Resp 10   Ht 5' 10" (1.778 m)   Wt 110.7 kg (244 lb)   SpO2 95%   BMI 35.01 kg/m²     "

## 2025-03-06 NOTE — OP NOTE
[facility] - Periop Services     Operative Note    SUMMARY     Date of Procedure: 3/6/2025     Procedure: Procedure(s) (LRB):  REPAIR, HERNIA, INGUINAL (Left)     Surgeons and Role:     * Filipe Bahena MD - Primary    Assisting Surgeon: None    Pre-Operative Diagnosis: Inguinal hernia without obstruction or gangrene, left, nonrecurrent    Post-Operative Diagnosis: Post-Op Diagnosis Codes:     * Inguinal hernia without obstruction or gangrene, nonrecurrent, left        Anesthesia: General    Technical Procedures Used:  The patient was brought to the operating room and placed in supine position. General anesthesia was administered per anesthesia staff. A sterile prep and drape was performed, to include an Ioban.  Incision was then performed obliquely overlying the left groin. Dissection was performed down to the external oblique aponeurosis. This structure was sharply incised, and the edges marked with hemostat. The incision was extended through the external ring. Spermatic cord was then isolated. The ilioinguinal nerve was never identified. During the course of this dissection, the contents of the indirect hernia were reduced back through the internal ring.  A Penrose drain was used to isolate the cord. A plug and patch were obtained. The large plug was placed in the floor the canal and secured to the transversalis and inguinal ligament. The keyhole patch was secured to the transversalis, pubic tubercle and inguinal ligament using interrupted Prolene suture. The tails of the patch were secured around the cord to re-create the internal ring. Subsequent to this, the external oblique aponeurosis was reapproximated with continuous running Vicryl. Interrupted Vicryl was then used to reapproximate the deep soft tissue and deep dermis. Continuous running 4-0 Vicryl was then used to reapproximate skin subcuticular position. Steri-Strips were placed and sterile dressings were applied. The patient was awakened  from anesthesia in stable condition.     Description of the Findings of the Procedure:  There was a large cord lipoma which was reduced back through the internal ring.  Hernia was repaired using a plug and patch with Kinjal technique.    Assistant(s):  BESSIE Ramon    Complications: No    Estimated Blood Loss (EBL): 25cc           Implants:   Implant Name Type Inv. Item Serial No.  Lot No. LRB No. Used Action   MESH POLY KNIT PERFIX PLG LG+ - [id number]  MESH POLY KNIT PERFIX PLG LG+  [employer] [place] [id number] Left 1 Implanted       Specimens:   Specimen (24h ago, onward)      None             Condition: Good      Complications:  None

## 2025-03-06 NOTE — ANESTHESIA PROCEDURE NOTES
Intubation    Date/Time: 3/6/2025 12:13 PM    Performed by: Clyde Brand CRNA  Authorized by: Cheko Thomson MD    Intubation:     Induction:  Intravenous    Intubated:  Postinduction    Mask Ventilation:  Easy mask    Attempts:  1    Attempted By:  CRNA    Method of Intubation:  Direct    Blade:  Mcdaniels 2    Laryngeal View Grade: Grade IIb - only the arytenoids and epiglottis seen      Difficult Airway Encountered?: No      Complications:  None    Airway Device:  Oral endotracheal tube    Airway Device Size:  7.5    Style/Cuff Inflation:  Cuffed (inflated to minimal occlusive pressure)    Tube secured:  22    Secured at:  The lips    Placement Verified By:  Capnometry    Complicating Factors:  Anterior larynx and beard    Findings Post-Intubation:  BS equal bilateral and atraumatic/condition of teeth unchanged

## 2025-03-07 NOTE — ANESTHESIA POSTPROCEDURE EVALUATION
Anesthesia Post Evaluation    Patient: Zachariah Aguilar    Procedure(s) Performed: Procedure(s) (LRB):  REPAIR, HERNIA, INGUINAL (Left)    Final Anesthesia Type: general      Patient location during evaluation: PACU  Patient participation: Yes- Able to Participate  Level of consciousness: awake and alert  Post-procedure vital signs: reviewed and stable  Pain management: adequate  Airway patency: patent  MAGALI mitigation strategies: Multimodal analgesia  PONV status at discharge: No PONV  Anesthetic complications: no      Cardiovascular status: blood pressure returned to baseline  Respiratory status: unassisted  Hydration status: euvolemic  Follow-up not needed.              Vitals Value Taken Time   /72 03/06/25 16:50   Temp 36.4 °C (97.6 °F) 03/06/25 14:23   Pulse 82 03/06/25 16:50   Resp 12 03/06/25 15:05   SpO2 95 % 03/06/25 16:50         Event Time   Out of Recovery 15:03:00         Pain/Juanita Score: Juanita Score: 10 (3/6/2025  6:48 PM)  Modified Juanita Score: 20 (3/6/2025  6:48 PM)

## 2025-03-11 ENCOUNTER — TELEPHONE (OUTPATIENT)
Dept: SURGERY | Facility: CLINIC | Age: 55
End: 2025-03-11
Payer: COMMERCIAL

## 2025-03-11 NOTE — LETTER
March 11, 2025      [facility] - General Surgery  [address]  [place] [address]  Phone: [phone]  Fax: [phone]       Patient: Zachariah Aguilar   YOB: 1970  Date of Visit: 03/11/2025    To Whom It May Concern:    Ilya Aguilar  was at [facility] on 3/6/25. Mrs Aguilar was off with her  after surgery. She may return to work on 3/12/25 with no restrictions. If you have any questions or concerns, or if I can be of further assistance, please do not hesitate to contact me.    Sincerely,    Filipe Cr M.D.

## 2025-03-11 NOTE — TELEPHONE ENCOUNTER
----- Message from Medardo sent at 3/11/2025  1:00 PM CDT -----  Who Called: Zachariah SeguraisPatient is returning phone callDoes the patient know what this is regarding?:wife needs a work excuse because husbands had surgery because her job is trying to write her up.Preferred Method of Contact: Phone CallPatient's Preferred Phone Number on File: [phone] or

## 2025-03-19 ENCOUNTER — OFFICE VISIT (OUTPATIENT)
Dept: SURGERY | Facility: CLINIC | Age: 55
End: 2025-03-19
Attending: SURGERY
Payer: COMMERCIAL

## 2025-03-19 VITALS — WEIGHT: 244 LBS | HEIGHT: 70 IN | BODY MASS INDEX: 34.93 KG/M2

## 2025-03-19 DIAGNOSIS — Z09 POSTOP CHECK: Primary | ICD-10-CM

## 2025-03-19 PROCEDURE — 99999 PR PBB SHADOW E&M-EST. PATIENT-LVL III: CPT | Mod: PBBFAC,,, | Performed by: SURGERY

## 2025-03-19 PROCEDURE — 1159F MED LIST DOCD IN RCRD: CPT | Mod: ,,, | Performed by: SURGERY

## 2025-03-19 PROCEDURE — 99213 OFFICE O/P EST LOW 20 MIN: CPT | Mod: PBBFAC | Performed by: SURGERY

## 2025-03-19 PROCEDURE — 99024 POSTOP FOLLOW-UP VISIT: CPT | Mod: ,,, | Performed by: SURGERY

## 2025-03-20 NOTE — PROGRESS NOTES
Status post left inguinal hernia repair, open.  He reports that he had some discomfort early on, but that he is improving.  The incision is healing nicely.  There was mild amount of swelling in the left groin that does not change with Valsalva.    He discussed his job description with the me and is not sure he will be able to return in 2 weeks.  He said there is no light duty at his job. We will extend his leave for 2 more weeks get him closer to roughly 6 weeks of rest.

## 2025-03-25 ENCOUNTER — OFFICE VISIT (OUTPATIENT)
Dept: VASCULAR SURGERY | Facility: CLINIC | Age: 55
End: 2025-03-25
Payer: COMMERCIAL

## 2025-03-25 VITALS
WEIGHT: 253.38 LBS | RESPIRATION RATE: 20 BRPM | DIASTOLIC BLOOD PRESSURE: 72 MMHG | HEART RATE: 85 BPM | SYSTOLIC BLOOD PRESSURE: 130 MMHG | BODY MASS INDEX: 36.27 KG/M2 | HEIGHT: 70 IN

## 2025-03-25 DIAGNOSIS — I87.2 VENOUS INSUFFICIENCY: Primary | ICD-10-CM

## 2025-03-25 DIAGNOSIS — R60.0 EDEMA, LOWER EXTREMITY: ICD-10-CM

## 2025-03-25 DIAGNOSIS — L81.9 HYPERPIGMENTATION OF SKIN: ICD-10-CM

## 2025-03-25 PROCEDURE — 99213 OFFICE O/P EST LOW 20 MIN: CPT | Mod: PBBFAC | Performed by: FAMILY MEDICINE

## 2025-03-25 PROCEDURE — 99214 OFFICE O/P EST MOD 30 MIN: CPT | Mod: S$PBB,,, | Performed by: FAMILY MEDICINE

## 2025-03-25 PROCEDURE — 3078F DIAST BP <80 MM HG: CPT | Mod: ,,, | Performed by: FAMILY MEDICINE

## 2025-03-25 PROCEDURE — 1159F MED LIST DOCD IN RCRD: CPT | Mod: ,,, | Performed by: FAMILY MEDICINE

## 2025-03-25 PROCEDURE — 1160F RVW MEDS BY RX/DR IN RCRD: CPT | Mod: ,,, | Performed by: FAMILY MEDICINE

## 2025-03-25 PROCEDURE — 3075F SYST BP GE 130 - 139MM HG: CPT | Mod: ,,, | Performed by: FAMILY MEDICINE

## 2025-03-25 PROCEDURE — 99999 PR PBB SHADOW E&M-EST. PATIENT-LVL III: CPT | Mod: PBBFAC,,, | Performed by: FAMILY MEDICINE

## 2025-03-25 PROCEDURE — 3008F BODY MASS INDEX DOCD: CPT | Mod: ,,, | Performed by: FAMILY MEDICINE

## 2025-03-25 NOTE — PROGRESS NOTES
[facility] CLINIC NOTE    Patient ID: Zachariah Aguilar is a 55 y.o. male.    I. HISTORY     Chief Complaint:   Chief Complaint   Patient presents with    Follow-up     3M PA         HPI: Zachariah Aguilar is a 55 y.o. male who presents today for a 3-month post ablation visit after undergoing a bilateral great saphenous vein and a left small saphenous vein non thermal adhesive ablations. The patient states that he has noted improvement in leg heaviness and swelling.  His ulceration of his right medial Gator region/ankle has also nearly healed.  He continues to wear compression most days, especially to work.  Overall, he feels he is doing well.    Clinical summary:  Patient initially seen on 09/25/2024 by referral from [facility] for evaluation of lower extremity edema, hyperpigmentation, wound and pain.  Symptoms are progressive/worsening and began greater than 5 years ago.  Location is bilateral lower extremities below the knees. Symptoms are worse at the end of the day.  History of venous interventions includes none.  Denies family history of venous disease.  Denies history of DVT or cellulitis.  The patient has been dealing with superficial venous ulcers below the bilateral medial malleoli which have been in various stages of healing over the past several months.  He has used intermittent compression to try to treat these which usually results in pain.  He continues to have edema, hyperpigmentation and pain in addition to these ulcers and feels these symptoms have become life altering would like to have the underlying condition corrected if possible.    Bilateral complete venous reflux study performed 10/23/2024 shows no evidence of DVT bilaterally.  The study also shows dilation and axial reflux of the bilateral great and left small saphenous veins.  Refluxing varicosities also noted bilaterally.    Venous Disease Medical Necessity Documentation Initial Visit Date:9/25/24 Return Check Date:    Have you ever  had a rupture or bleed from a varicose vein in your leg(s)?              [] Yes  [x] No   [] Yes   [] No   Have you ever been diagnosed with phlebitis, cellulitis, or inflammation in the area of the varicose veins of  your leg(s)?  [] Yes  [x] No    [] Yes   [] No   Do you have darkened or inflamed skin on your legs?   [x] Yes   [] No   [] Yes   [] No   Do you have leg swelling?     [x] Yes   [] No   [] Yes   [] No   Do you have leg pain?   [x] Yes   [] No   [] Yes   [] No   If yes, describe the type of pain?    [x]   Stabbing []  Radiating [x]  Aching   [x]  Tightness []  Throbbing               [x]  Burning [x]  Cramping              Do you have leg discomfort?   [x] Yes   [] No   [] Yes   [] No   If yes, describe the type of discomfort?    [x]  Heaviness [x]  Fullness   [x]  Restlessness [x] Tired/Fatigued [] Itching              Have you ever worn compression hose?   [x] Yes   [] No   [] Yes   [] No   If yes, how long? 2M          Do you elevate your legs while sitting?   [x] Yes   [] No   [] Yes   [] No   Does venous disease (varicose veins, ulcers, skin changes, leg pain/swelling) interfere with your daily life?  If yes, check activities you are limited or unable to do.    []  Shower  [x]   Walk  []  Exercise  [] Play with children/grandchildren  []  Shop [] Work [x] Stand for any period of time [x] Sleep                               [x] Sitting for an extended period of time.           [x] Yes   [] No   [] Yes   [] No   Do you exercise/have you tried to exercise (i.e.  Walk our participate in a regular exercise routine)?  [] Yes  [x] No   [] Yes   [] No   BMI 40.9             History reviewed. No pertinent past medical history.     Past Surgical History:   Procedure Laterality Date    ABLATION, CHEMICAL SEALANT, VARICOSE VEIN Right 11/21/2024    Right GSV Venaseal Ablation performed by Dr. Rahul Gooden    ABLATION, CHEMICAL SEALANT, VARICOSE VEIN Left 12/05/2024    Left GSV Venaseal Ablation performed by   Rahul Gooden    ABLATION, CHEMICAL SEALANT, VARICOSE VEIN Left 12/12/2024    Left SSV Venaseal Ablation performed by Dr. Rahul Gooden    REPAIR, HERNIA, INGUINAL Left 3/6/2025    Procedure: REPAIR, HERNIA, INGUINAL;  Surgeon: Josef, Filipe ROMERO MD;  Location: [facility];  Service: General;  Laterality: Left;       Social History     Tobacco Use   Smoking Status Never   Smokeless Tobacco Never         Current Outpatient Medications:     tamsulosin (FLOMAX) 0.4 mg Cap, Take 1 capsule (0.4 mg total) by mouth once daily., Disp: 90 capsule, Rfl: 1    meloxicam (MOBIC) 15 MG tablet, Take 1 tablet (15 mg total) by mouth once daily., Disp: 30 tablet, Rfl: 2    predniSONE (DELTASONE) 10 MG tablet, Take 2 po daily for 2 days. Then, 1 po daily until gone., Disp: 7 tablet, Rfl: 0    Review of Systems   Constitutional:  Negative for activity change, chills, diaphoresis, fatigue and fever.   Respiratory:  Negative for cough and shortness of breath.    Cardiovascular:  Positive for leg swelling. Negative for chest pain and claudication.        Hyperpigmentation LE   Gastrointestinal:  Negative for nausea and vomiting.   Musculoskeletal:  Positive for leg pain. Negative for joint swelling.   Integumentary:  Positive for wound. Negative for rash.   Neurological:  Negative for weakness and numbness.          II. PHYSICAL EXAM     Physical Exam  Constitutional:       General: He is awake. He is not in acute distress.     Appearance: Normal appearance. He is overweight. He is not ill-appearing or toxic-appearing.   HENT:      Head: Normocephalic and atraumatic.   Eyes:      Extraocular Movements: Extraocular movements intact.      Conjunctiva/sclera: Conjunctivae normal.      Pupils: Pupils are equal, round, and reactive to light.   Neck:      Vascular: No carotid bruit or JVD.   Cardiovascular:      Rate and Rhythm: Normal rate and regular rhythm.      Pulses:           Dorsalis pedis pulses are detected w/ Doppler on the right side and  detected w/ Doppler on the left side.        Posterior tibial pulses are detected w/ Doppler on the right side and detected w/ Doppler on the left side.      Heart sounds: No murmur heard.  Pulmonary:      Effort: Pulmonary effort is normal. No respiratory distress.      Breath sounds: No stridor. No wheezing, rhonchi or rales.   Musculoskeletal:         General: No swelling, tenderness or deformity.      Right lower le+ Edema present.      Left lower le+ Edema present.      Comments: No evidence of active cellulitis bilaterally.  Small stasis ulcers below the bilateral medial malleolus.  See pictures for details.   Feet:      Comments: Triphasic hand-held dopplerable pulses of the bilateral dorsalis pedis and posterior tibial arteries.  Skin:     General: Skin is warm.      Capillary Refill: Capillary refill takes less than 2 seconds.      Coloration: Skin is not ashen.      Findings: No bruising, erythema, lesion, rash or wound.   Neurological:      Mental Status: He is alert and oriented to person, place, and time.      Motor: No weakness.   Psychiatric:         Speech: Speech normal.         Behavior: Behavior normal. Behavior is cooperative.         Reticular/Spider veins noted:  RLE: none  LLE: none    Varicose veins noted:  RLE: none  LLE:  none    CEAP Classification  Clinical Signs: Class 6 - Leg ulceration, skin changes as defined above  Etiologic Classification: Primary  Anatomic distribution: Superficial  Pathophysiologic dysfunction: Reflux       Venous Clinical Severity Score  Pain:1=Occasional, not restricting activity or requiring analgesics  Varicose Veins: 0=None  Venous Edema: 1=Evening ankle edema only  Pigmentation: 1=Diffuse, but limited in area and old (brown)  Inflammation: 0=None  Induration: 0=None  Number of Active Ulcers: 0=0  Active Ulceration, Duration: 0=None  Active Ulcer Size: 0=None  Compressive Therapy: 2=Wears elastic stockings most days  Total Score: 5       III.  ASSESSMENT & PLAN (MEDICAL DECISION MAKING)     1. Venous insufficiency    2. Hyperpigmentation of skin    3. Edema, lower extremity          Assessment/Diagnosis and Plan:  The patient doing well post ablation and encouraged to at least wear knee-high compression along with leg elevation and leg exercise.  Follow-up in 3 months for a 6-month post ablation visit.  No ultrasound unless he is having trouble.          Wolf Gooden, DO

## 2025-04-29 ENCOUNTER — OFFICE VISIT (OUTPATIENT)
Dept: FAMILY MEDICINE | Facility: CLINIC | Age: 55
End: 2025-04-29
Payer: COMMERCIAL

## 2025-04-29 VITALS
WEIGHT: 255 LBS | DIASTOLIC BLOOD PRESSURE: 89 MMHG | OXYGEN SATURATION: 96 % | BODY MASS INDEX: 36.51 KG/M2 | TEMPERATURE: 98 F | SYSTOLIC BLOOD PRESSURE: 138 MMHG | HEART RATE: 96 BPM | RESPIRATION RATE: 18 BRPM | HEIGHT: 70 IN

## 2025-04-29 DIAGNOSIS — K46.9 ABDOMINAL HERNIA WITHOUT OBSTRUCTION AND WITHOUT GANGRENE, RECURRENCE NOT SPECIFIED, UNSPECIFIED HERNIA TYPE: ICD-10-CM

## 2025-04-29 DIAGNOSIS — Z00.00 ROUTINE GENERAL MEDICAL EXAMINATION AT A HEALTH CARE FACILITY: Primary | ICD-10-CM

## 2025-04-29 DIAGNOSIS — Z13.220 SCREENING FOR LIPOID DISORDERS: ICD-10-CM

## 2025-04-29 DIAGNOSIS — Z13.1 SCREENING FOR DIABETES MELLITUS: ICD-10-CM

## 2025-04-29 PROCEDURE — 3075F SYST BP GE 130 - 139MM HG: CPT | Mod: ,,, | Performed by: INTERNAL MEDICINE

## 2025-04-29 PROCEDURE — 3008F BODY MASS INDEX DOCD: CPT | Mod: ,,, | Performed by: INTERNAL MEDICINE

## 2025-04-29 PROCEDURE — 1159F MED LIST DOCD IN RCRD: CPT | Mod: ,,, | Performed by: INTERNAL MEDICINE

## 2025-04-29 PROCEDURE — 99396 PREV VISIT EST AGE 40-64: CPT | Mod: ,,, | Performed by: INTERNAL MEDICINE

## 2025-04-29 PROCEDURE — 86803 HEPATITIS C AB TEST: CPT | Mod: ,,, | Performed by: CLINICAL MEDICAL LABORATORY

## 2025-04-29 PROCEDURE — 87389 HIV-1 AG W/HIV-1&-2 AB AG IA: CPT | Mod: ,,, | Performed by: CLINICAL MEDICAL LABORATORY

## 2025-04-29 PROCEDURE — 83036 HEMOGLOBIN GLYCOSYLATED A1C: CPT | Mod: ,,, | Performed by: CLINICAL MEDICAL LABORATORY

## 2025-04-29 PROCEDURE — 3079F DIAST BP 80-89 MM HG: CPT | Mod: ,,, | Performed by: INTERNAL MEDICINE

## 2025-04-29 NOTE — PROGRESS NOTES
"Subjective:       Patient ID: Zachariah Aguilar is a 55 y.o. male.    Chief Complaint: Healthy You and Health Maintenance (Hepatitis C Screening Never done-Today/Lipid Panel Never done-today /HIV Screening Never done-today/TETANUS VACCINE Never done-refuses/Hemoglobin A1c (Diabetic Prevention Screening) Never done-today/Colorectal Cancer Screening Never done-today/Shingles Vaccine(1 of 2) Never done-refuses/Pneumococcal Vaccines (Age 50+)(1 of 1 - PCV) Never done-refuses/Influenza Vaccine(1) Never done-refuses/COVID-19 Vaccine(1 - 2024-25 season) Never done-refuses/)    History of Present Illness    CHIEF COMPLAINT:  Patient presents today for follow up.    POST-OPERATIVE STATUS:  He underwent left hernia repair by Dr. Gibbons two weeks ago. He reports blood at the surgical site and mild pain in the area. He has not seen Dr. Gibbons since the procedure.    MUSCULOSKELETAL:  He experiences intermittent right knee pain. X-ray showed mild arthritis and a possible bone spur.    LABS AND IMAGING:  Labs from one month ago showed normal sodium, blood sugar, kidney function, PSA, and no evidence of anemia. Pelvic ultrasound demonstrated normal bladder scan without significant post-void residual and normal prostate size.         Current Medications:  Current Medications[1]           ROS  Twelve point system reviewed, unremarkable except for stated above in HPI.        Objective:         Vitals:    04/29/25 1623   BP: 138/89   BP Location: Left arm   Patient Position: Sitting   Pulse: 96   Resp: 18   Temp: 97.7 °F (36.5 °C)   TempSrc: Temporal   SpO2: 96%   Weight: 115.7 kg (255 lb)   Height: 5' 10" (1.778 m)        Physical Exam     Patient is awake alert oriented person place and  Lungs are clear to auscultation bilaterally no crackles or wheezes   Cardiovascular S1-S2 regular rate and rhythm no murmurs rubs or gallops   Abdomen is soft positive bowel sounds nontender, extremities no clubbing cyanosis edema  Neuro no focal " neurological deficits  Skin warm and dry.     Last Labs:     No visits with results within 1 Month(s) from this visit.   Latest known visit with results is:   Admission on 03/06/2025, Discharged on 03/06/2025   Component Date Value    QRS Duration 03/06/2025 84     [facility] QTC Calculation 03/06/2025 416     Sodium 03/06/2025 135 (L)     Potassium 03/06/2025 4.3     Chloride 03/06/2025 103     CO2 03/06/2025 26     Anion Gap 03/06/2025 10     Glucose 03/06/2025 93     BUN 03/06/2025 11     Creatinine 03/06/2025 0.86     BUN/Creatinine Ratio 03/06/2025 13     Calcium 03/06/2025 9.3     eGFR 03/06/2025 102     WBC 03/06/2025 4.24 (L)     RBC 03/06/2025 5.00     Hemoglobin 03/06/2025 14.2     Hematocrit 03/06/2025 43.7     MCV 03/06/2025 87.4     MCH 03/06/2025 28.4     MCHC 03/06/2025 32.5     RDW 03/06/2025 14.0     Platelet Count 03/06/2025 274     MPV 03/06/2025 9.7     Neutrophils % 03/06/2025 54.7     Lymphocytes % 03/06/2025 34.0     Monocytes % 03/06/2025 9.9 (H)     Eosinophils % 03/06/2025 0.5 (L)     Basophils % 03/06/2025 0.7     Immature Granulocytes % 03/06/2025 0.2     nRBC, Auto 03/06/2025 0.0     Neutrophils, Abs 03/06/2025 2.32     Lymphocytes, Absolute 03/06/2025 1.44     Monocytes, Absolute 03/06/2025 0.42     Eosinophils, Absolute 03/06/2025 0.02     Basophils, Absolute 03/06/2025 0.03     Immature Granulocytes, A* 03/06/2025 0.01     nRBC, Absolute 03/06/2025 0.00     Diff Type 03/06/2025 Auto        Last Imaging:  X-Ray Knee Complete 4 Or More Views Right  Narrative: EXAMINATION:  XR KNEE COMP 4 OR MORE VIEWS RIGHT    CLINICAL HISTORY:  Unilateral primary osteoarthritis, right knee    TECHNIQUE:  Four views of the right knee    COMPARISON:  02/03/2025    FINDINGS:  Very mild degenerative changes.  2 mm calcific density projecting along the lateral aspect of the medial femoral condyle at the intercondylar notch on 1 of the views, apparent PA flexion view although not labeled, possibly spur or small  peritendinous calcification or even loose body.  Not seen on prior images but without comparable projection on the prior study.  Small joint effusion.  Acute fracture or dislocation is not seen  Impression: As above    Electronically signed by: Sima Gant MD  Date:    02/25/2025  Time:    09:23         **Labs and x-rays personally reviewed by me    ** reviewed           Assessment & Plan:   Assessment & Plan    IMPRESSION:  - Reviewed recent lab results: sodium, blood sugar, renal function, and PSA all WNL.  - Assessed XR Right Knee showing mild arthritis and possible bone spur.  - Evaluated pelvic US indicating normal bladder and prostate size.  - Noted left-sided hernia repair by Dr. Bahena approximately 2 weeks ago.  - Postponed lipid panel due to patient not being in a fasting state.  - Plan to proceed with other non-fasting labs during current visit.  - Determined need for colonoscopy.    HYPERLIPIDEMIA:  - Ordered non-fasting labs to be completed today.  - Explained importance of fasting for accurate lipid panel results with no food after midnight before future blood draws.    HERNIA:  - Referred to Dr. Bahena within the next week for evaluation of potential post-hernia repair complications.  - Follow up in 6 weeks to ensure complete healing.    COLORECTAL CANCER SCREENING:  - Contact the office to schedule colonoscopy.           1. Routine general medical examination at a health care facility  -     Hemoglobin A1C; Future; Expected date: 04/29/2025  -     Hepatitis C Antibody; Future; Expected date: 04/29/2025  -     HIV 1/2 Ag/Ab (4th Gen); Future; Expected date: 04/29/2025    2. Abdominal hernia without obstruction and without gangrene, recurrence not specified, unspecified hernia type  -     Ambulatory referral/consult to General Surgery; Future; Expected date: 05/06/2025            Mac Arrington MD  This note was generated with the assistance of ambient listening technology. Verbal consent was  obtained by the patient and accompanying visitor(s) for the recording of patient appointment to facilitate this note. I attest to having reviewed and edited the generated note for accuracy, though some syntax or spelling errors may persist. Please contact the author of this note for any clarification.            [1]   Current Outpatient Medications:     meloxicam (MOBIC) 15 MG tablet, Take 1 tablet (15 mg total) by mouth once daily., Disp: 30 tablet, Rfl: 2    predniSONE (DELTASONE) 10 MG tablet, Take 2 po daily for 2 days. Then, 1 po daily until gone., Disp: 7 tablet, Rfl: 0    tamsulosin (FLOMAX) 0.4 mg Cap, Take 1 capsule (0.4 mg total) by mouth once daily., Disp: 90 capsule, Rfl: 1

## 2025-04-30 ENCOUNTER — PATIENT OUTREACH (OUTPATIENT)
Facility: HOSPITAL | Age: 55
End: 2025-04-30
Payer: COMMERCIAL

## 2025-04-30 DIAGNOSIS — Z13.220 SCREENING FOR LIPOID DISORDERS: Primary | ICD-10-CM

## 2025-04-30 DIAGNOSIS — Z13.1 SCREENING FOR DIABETES MELLITUS: ICD-10-CM

## 2025-04-30 LAB
CHOLEST SERPL-MCNC: 225 MG/DL
CHOLEST/HDLC SERPL: 3.4 {RATIO}
EST. AVERAGE GLUCOSE BLD GHB EST-MCNC: 126 MG/DL
GLUCOSE SERPL-MCNC: 88 MG/DL (ref 74–100)
HBA1C MFR BLD HPLC: 6 %
HCV AB SER QL: NORMAL
HDLC SERPL-MCNC: 66 MG/DL (ref 35–60)
HIV 1+O+2 AB SERPL QL: NORMAL
LDLC SERPL CALC-MCNC: 130 MG/DL
LDLC/HDLC SERPL: 2 {RATIO}
NONHDLC SERPL-MCNC: 159 MG/DL
TRIGL SERPL-MCNC: 147 MG/DL (ref 34–140)
VLDLC SERPL-MCNC: 29 MG/DL

## 2025-04-30 PROCEDURE — 82947 ASSAY GLUCOSE BLOOD QUANT: CPT | Mod: ,,, | Performed by: CLINICAL MEDICAL LABORATORY

## 2025-04-30 PROCEDURE — 80061 LIPID PANEL: CPT | Mod: ,,, | Performed by: CLINICAL MEDICAL LABORATORY

## 2025-04-30 NOTE — PROGRESS NOTES
Population Health Chart Review & Patient Outreach Details    Post visit Population Health review of encounter with date of service  25 with [facility].  Not all required HY components in encounter.  Needs cpt for age 55  message sent and glu and lipid added to labs.  Followup appt for:  26 HY  Further Action Needed If Patient Returns Outreach:        Health Maintenance Due   Topic Date Due    Hepatitis C Screening  Never done    Lipid Panel  Never done    HIV Screening  Never done    TETANUS VACCINE  Never done    Hemoglobin A1c (Diabetic Prevention Screening)  Never done    Colorectal Cancer Screening  Never done    Shingles Vaccine (1 of 2) Never done    Pneumococcal Vaccines (Age 50+) (1 of 1 - PCV) Never done    Influenza Vaccine (1) Never done    COVID-19 Vaccine ( season) Never done          Updates Requested / Reviewed:     []  Care Everywhere    []     []  External Sources ([facility], [facility], [facility], etc.)    [] [facility]   [] [facility]   [] Other:    []  Care Team Updated   []  Removed  or Duplicate Orders   []  Immunization Reconciliation Completed / Queried    [] [place]   [] [place]   [] [place]   [] [place]      Health Maintenance Topics Addressed and Outreach Outcomes / Actions Taken:             Breast Cancer Screening []  Mammogram Order Placed    []  Mammogram Screening Scheduled    []  External Records Requested & Care Team Updated if Applicable    []  External Records Uploaded & Care Team Updated if Applicable    []  Pt Declined Scheduling Mammogram    []  Pt Will Schedule with External Provider / Order Routed & Care Team Updated if Applicable              Cervical Cancer Screening []  Pap Smear Scheduled in Primary Care or OBGYN    []  External Records Requested & Care Team Updated if Applicable       []  External Records Uploaded, Care Team Updated, & History Updated if Applicable    []  Patient Declined Scheduling Pap Smear    []  Patient Will Schedule with External  Provider & Care Team Updated if Applicable                  Colorectal Cancer Screening []  Colonoscopy Case Request / Referral / Home Test Order Placed    []  External Records Requested & Care Team Updated if Applicable    []  External Records Uploaded, Care Team Updated, & History Updated if Applicable    []  Patient Declined Completing Colon Cancer Screening    []  Patient Will Schedule with External Provider & Care Team Updated if Applicable    []  Fit Kit Mailed (add the SmartPhrase under additional notes)    []  Reminded Patient to Complete Home Test                Diabetic Eye Exam []  Eye Exam Screening Order Placed    []  Eye Camera Scheduled or Optometry/Ophthalmology Referral Placed    []  External Records Requested & Care Team Updated if Applicable    []  External Records Uploaded, Care Team Updated, & History Updated if Applicable    []  Patient Declined Scheduling Eye Exam    []  Patient Will Schedule with External Provider & Care Team Updated if Applicable             Blood Pressure Control []  Primary Care Follow Up Visit Scheduled     []  Remote Blood Pressure Reading Captured    []  Patient Declined Remote Reading or Scheduling Appt - Escalated to PCP    []  Patient Will Call Back or Send Portal Message with Reading                 HbA1c & Other Labs []  Overdue Lab(s) Ordered    []  Overdue Lab(s) Scheduled    []  External Records Uploaded & Care Team Updated if Applicable    []  Primary Care Follow Up Visit Scheduled     []  Reminded Patient to Complete A1c Home Test    []  Patient Declined Scheduling Labs or Will Call Back to Schedule    []  Patient Will Schedule with External Provider / Order Routed, & Care Team Updated if Applicable           Primary Care Appointment []  Primary Care Appt Scheduled    []  Patient Declined Scheduling or Will Call Back to Schedule    []  Pt Established with External Provider, Updated Care Team, & Informed Pt to Notify Payor if Applicable           Medication  Adherence /    Statin Use []  Primary Care Appointment Scheduled    []  Patient Reminded to  Prescription    []  Patient Declined, Provider Notified if Needed    []  Sent Provider Message to Review to Evaluate Pt for Statin, Add Exclusion Dx Codes, Document   Exclusion in Problem List, Change Statin Intensity Level to Moderate or High Intensity if Applicable                Osteoporosis Screening []  Dexa Order Placed    []  Dexa Appointment Scheduled    []  External Records Requested & Care Team Updated    []  External Records Uploaded, Care Team Updated, & History Updated if Applicable    []  Patient Declined Scheduling Dexa or Will Call Back to Schedule    []  Patient Will Schedule with External Provider / Order Routed & Care Team Updated if Applicable       Additional Notes:

## 2025-05-01 ENCOUNTER — RESULTS FOLLOW-UP (OUTPATIENT)
Dept: FAMILY MEDICINE | Facility: CLINIC | Age: 55
End: 2025-05-01
Payer: COMMERCIAL

## 2025-05-14 ENCOUNTER — OFFICE VISIT (OUTPATIENT)
Dept: FAMILY MEDICINE | Facility: CLINIC | Age: 55
End: 2025-05-14
Payer: COMMERCIAL

## 2025-05-14 VITALS
OXYGEN SATURATION: 94 % | SYSTOLIC BLOOD PRESSURE: 134 MMHG | DIASTOLIC BLOOD PRESSURE: 78 MMHG | BODY MASS INDEX: 37.16 KG/M2 | WEIGHT: 259 LBS | TEMPERATURE: 99 F | HEART RATE: 105 BPM | RESPIRATION RATE: 20 BRPM

## 2025-05-14 DIAGNOSIS — R05.1 ACUTE COUGH: ICD-10-CM

## 2025-05-14 DIAGNOSIS — J01.90 ACUTE NON-RECURRENT SINUSITIS, UNSPECIFIED LOCATION: Primary | ICD-10-CM

## 2025-05-14 PROCEDURE — 3044F HG A1C LEVEL LT 7.0%: CPT | Mod: ,,, | Performed by: NURSE PRACTITIONER

## 2025-05-14 PROCEDURE — 3075F SYST BP GE 130 - 139MM HG: CPT | Mod: ,,, | Performed by: NURSE PRACTITIONER

## 2025-05-14 PROCEDURE — 3008F BODY MASS INDEX DOCD: CPT | Mod: ,,, | Performed by: NURSE PRACTITIONER

## 2025-05-14 PROCEDURE — 1160F RVW MEDS BY RX/DR IN RCRD: CPT | Mod: ,,, | Performed by: NURSE PRACTITIONER

## 2025-05-14 PROCEDURE — 96372 THER/PROPH/DIAG INJ SC/IM: CPT | Mod: ,,, | Performed by: NURSE PRACTITIONER

## 2025-05-14 PROCEDURE — 3078F DIAST BP <80 MM HG: CPT | Mod: ,,, | Performed by: NURSE PRACTITIONER

## 2025-05-14 PROCEDURE — 1159F MED LIST DOCD IN RCRD: CPT | Mod: ,,, | Performed by: NURSE PRACTITIONER

## 2025-05-14 PROCEDURE — 99213 OFFICE O/P EST LOW 20 MIN: CPT | Mod: 25,,, | Performed by: NURSE PRACTITIONER

## 2025-05-14 RX ORDER — AMOXICILLIN AND CLAVULANATE POTASSIUM 875; 125 MG/1; MG/1
1 TABLET, FILM COATED ORAL 2 TIMES DAILY
Qty: 20 TABLET | Refills: 0 | Status: SHIPPED | OUTPATIENT
Start: 2025-05-14 | End: 2025-05-24

## 2025-05-14 RX ORDER — CEFTRIAXONE 1 G/1
1 INJECTION, POWDER, FOR SOLUTION INTRAMUSCULAR; INTRAVENOUS
Status: COMPLETED | OUTPATIENT
Start: 2025-05-14 | End: 2025-05-14

## 2025-05-14 RX ORDER — METHYLPREDNISOLONE ACETATE 40 MG/ML
40 INJECTION, SUSPENSION INTRA-ARTICULAR; INTRALESIONAL; INTRAMUSCULAR; SOFT TISSUE
Status: COMPLETED | OUTPATIENT
Start: 2025-05-14 | End: 2025-05-14

## 2025-05-14 RX ORDER — DEXAMETHASONE SODIUM PHOSPHATE 4 MG/ML
4 INJECTION, SOLUTION INTRA-ARTICULAR; INTRALESIONAL; INTRAMUSCULAR; INTRAVENOUS; SOFT TISSUE
Status: COMPLETED | OUTPATIENT
Start: 2025-05-14 | End: 2025-05-14

## 2025-05-14 RX ADMIN — METHYLPREDNISOLONE ACETATE 40 MG: 40 INJECTION, SUSPENSION INTRA-ARTICULAR; INTRALESIONAL; INTRAMUSCULAR; SOFT TISSUE at 05:05

## 2025-05-14 RX ADMIN — CEFTRIAXONE 1 G: 1 INJECTION, POWDER, FOR SOLUTION INTRAMUSCULAR; INTRAVENOUS at 05:05

## 2025-05-14 RX ADMIN — DEXAMETHASONE SODIUM PHOSPHATE 4 MG: 4 INJECTION, SOLUTION INTRA-ARTICULAR; INTRALESIONAL; INTRAMUSCULAR; INTRAVENOUS; SOFT TISSUE at 05:05

## 2025-05-14 NOTE — PROGRESS NOTES
Subjective:       Patient ID: Zachariah Aguilar is a 55 y.o. male.    Chief Complaint: Cough, Sinus Problem, and Wheezing (X 3 days)    Cough, Sinus Problem, and Wheezing (X 3 days)      Cough  Associated symptoms include wheezing. Pertinent negatives include no chest pain, ear pain, fever, headaches, rash, sore throat or shortness of breath.   Sinus Problem  Associated symptoms include congestion, coughing and sinus pressure. Pertinent negatives include no ear pain, headaches, neck pain, shortness of breath or sore throat.   Wheezing   Associated symptoms include coughing. Pertinent negatives include no abdominal pain, chest pain, ear pain, fever, headaches, neck pain, rash, shortness of breath, sore throat or vomiting.   Review of Systems   Constitutional:  Negative for appetite change, fatigue and fever.   HENT:  Positive for nasal congestion and sinus pressure/congestion. Negative for ear pain and sore throat.    Eyes:  Negative for pain, discharge and itching.   Respiratory:  Positive for cough and wheezing. Negative for shortness of breath.    Cardiovascular:  Negative for chest pain and leg swelling.   Gastrointestinal:  Negative for abdominal pain, change in bowel habit, nausea and vomiting.   Musculoskeletal:  Negative for back pain, gait problem and neck pain.   Integumentary:  Negative for rash and wound.   Neurological:  Negative for dizziness, weakness and headaches.   All other systems reviewed and are negative.      Objective:      Physical Exam  Vitals and nursing note reviewed.   Constitutional:       General: He is not in acute distress.     Appearance: Normal appearance. He is not ill-appearing, toxic-appearing or diaphoretic.   HENT:      Head: Normocephalic.      Right Ear: Tympanic membrane, ear canal and external ear normal.      Left Ear: Tympanic membrane, ear canal and external ear normal.      Nose: Mucosal edema, congestion and rhinorrhea present.      Right Turbinates: Swollen.      Left  Turbinates: Swollen.      Mouth/Throat:      Mouth: Mucous membranes are moist.      Pharynx: Posterior oropharyngeal erythema and postnasal drip present. No oropharyngeal exudate.   Eyes:      General: No scleral icterus.        Right eye: No discharge.         Left eye: No discharge.      Extraocular Movements: Extraocular movements intact.      Conjunctiva/sclera: Conjunctivae normal.      Pupils: Pupils are equal, round, and reactive to light.   Cardiovascular:      Rate and Rhythm: Normal rate and regular rhythm.      Pulses: Normal pulses.      Heart sounds: Normal heart sounds. No murmur heard.  Pulmonary:      Effort: Pulmonary effort is normal. No respiratory distress.      Breath sounds: No wheezing, rhonchi or rales.      Comments: Coarse breath sounds  Musculoskeletal:         General: Normal range of motion.      Cervical back: Normal range of motion and neck supple. No tenderness.   Lymphadenopathy:      Cervical: No cervical adenopathy.   Skin:     General: Skin is warm and dry.      Capillary Refill: Capillary refill takes less than 2 seconds.      Findings: No rash.   Neurological:      Mental Status: He is alert and oriented to person, place, and time.   Psychiatric:         Mood and Affect: Mood normal.         Behavior: Behavior normal.         Thought Content: Thought content normal.         Judgment: Judgment normal.          Assessment:       1. Acute non-recurrent sinusitis, unspecified location    2. Acute cough        Plan:   Acute non-recurrent sinusitis, unspecified location  -     dexAMETHasone injection 4 mg  -     methylPREDNISolone acetate injection 40 mg  -     cefTRIAXone injection 1 g  -     amoxicillin-clavulanate 875-125mg (AUGMENTIN) 875-125 mg per tablet; Take 1 tablet by mouth 2 (two) times daily. for 20 doses  Dispense: 20 tablet; Refill: 0  -     dexbrompheniramine-phenylep-DM 2-10-20 mg Tab; Take 1 tablet by mouth every 4 (four) hours as needed (cough/congestion).   Dispense: 20 tablet; Refill: 1    Acute cough  -     dexbrompheniramine-phenylep-DM 2-10-20 mg Tab; Take 1 tablet by mouth every 4 (four) hours as needed (cough/congestion).  Dispense: 20 tablet; Refill: 1           Risks, benefits, and side effects were discussed with the patient. All questions were answered to the fullest satisfaction of the patient, and pt verbalized understanding and agreement to treatment plan. Pt was to call with any new or worsening symptoms, or present to the ER

## 2025-05-16 ENCOUNTER — PATIENT OUTREACH (OUTPATIENT)
Facility: HOSPITAL | Age: 55
End: 2025-05-16
Payer: COMMERCIAL

## 2025-05-16 NOTE — PROGRESS NOTES
Population Health Review...  Per [facility] website, insurance is active and patient is enrolled in CM:  CM! Provider CMH! Benefit Start Date CMH! Benefit End Date Baseline Risk Track(s) Baseline Risk Level Current Risk Tracks(s) Current Risk Level   LIANE PEREZ MD 04/30/2025 06/28/2026 Hypertension, Glucose, Cholesterol Moderate Hypertension, Glucose, Cholesterol Moderate

## 2025-05-19 ENCOUNTER — OFFICE VISIT (OUTPATIENT)
Dept: FAMILY MEDICINE | Facility: CLINIC | Age: 55
End: 2025-05-19
Payer: COMMERCIAL

## 2025-05-19 VITALS
TEMPERATURE: 97 F | RESPIRATION RATE: 18 BRPM | SYSTOLIC BLOOD PRESSURE: 140 MMHG | HEART RATE: 95 BPM | BODY MASS INDEX: 35.93 KG/M2 | HEIGHT: 70 IN | OXYGEN SATURATION: 95 % | WEIGHT: 251 LBS | DIASTOLIC BLOOD PRESSURE: 96 MMHG

## 2025-05-19 DIAGNOSIS — R73.03 PRE-DIABETES: ICD-10-CM

## 2025-05-19 DIAGNOSIS — I10 ESSENTIAL (PRIMARY) HYPERTENSION: ICD-10-CM

## 2025-05-19 DIAGNOSIS — E66.9 OBESITY, UNSPECIFIED CLASS, UNSPECIFIED OBESITY TYPE, UNSPECIFIED WHETHER SERIOUS COMORBIDITY PRESENT: ICD-10-CM

## 2025-05-19 DIAGNOSIS — E78.5 DYSLIPIDEMIA: Primary | ICD-10-CM

## 2025-05-19 PROCEDURE — 3080F DIAST BP >= 90 MM HG: CPT | Mod: ,,, | Performed by: INTERNAL MEDICINE

## 2025-05-19 PROCEDURE — 99213 OFFICE O/P EST LOW 20 MIN: CPT | Mod: ,,, | Performed by: INTERNAL MEDICINE

## 2025-05-19 PROCEDURE — 3077F SYST BP >= 140 MM HG: CPT | Mod: ,,, | Performed by: INTERNAL MEDICINE

## 2025-05-19 PROCEDURE — 1159F MED LIST DOCD IN RCRD: CPT | Mod: ,,, | Performed by: INTERNAL MEDICINE

## 2025-05-19 PROCEDURE — 3008F BODY MASS INDEX DOCD: CPT | Mod: ,,, | Performed by: INTERNAL MEDICINE

## 2025-05-19 PROCEDURE — 3044F HG A1C LEVEL LT 7.0%: CPT | Mod: ,,, | Performed by: INTERNAL MEDICINE

## 2025-05-19 NOTE — PROGRESS NOTES
"Subjective:       Patient ID: Zachariah Aguilar is a 55 y.o. male.    Chief Complaint: Color Me Healthy and Health Maintenance (Colorectal Cancer Screening Never done/Shingles Vaccine(1 of 2) Never done/Pneumococcal Vaccines (Age 50+)(1 of 1 - PCV) Never done/COVID-19 Vaccine(3 - 2024-25 season) due on 09/01/2024/)    History of Present Illness    CHIEF COMPLAINT:  Patient presents today for follow up    BLOOD PRESSURE:  Recent blood pressure reading was approximately 130/88-89.    RECENT MEDICAL HISTORY:  He had recent surgery which interrupted his exercise routine. He reports having a cold last week.         Current Medications:  Current Medications[1]           ROS  Twelve point system reviewed, unremarkable except for stated above in HPI.        Objective:         Vitals:    05/19/25 1447 05/19/25 1448   BP: (!) 140/91 (!) 140/96   BP Location: Left arm    Patient Position: Sitting    Pulse: 95    Resp: 18    Temp: 97.3 °F (36.3 °C)    TempSrc: Temporal    SpO2: 95%    Weight: 113.9 kg (251 lb)    Height: 5' 10" (1.778 m)         Physical Exam     Patient is awake alert oriented person place and  Lungs are clear to auscultation bilaterally no crackles or wheezes   Cardiovascular S1-S2 regular rate and rhythm no murmurs rubs or gallops   Abdomen is soft positive bowel sounds nontender, extremities no clubbing cyanosis edema  Neuro no focal neurological deficits  Skin warm and dry.     Last Labs:     Office Visit on 04/29/2025   Component Date Value    Hemoglobin A1C 04/29/2025 6.0     Estimated Average Glucose 04/29/2025 126     Hepatitis C Ab 04/29/2025 Non-Reactive     HIV 1/2 04/29/2025 Non-Reactive     Triglycerides 04/30/2025 147 (H)     Cholesterol 04/30/2025 225 (H)     HDL Cholesterol 04/30/2025 66 (H)     Cholesterol/HDL Ratio (R* 04/30/2025 3.4     Non-HDL 04/30/2025 159     LDL Calculated 04/30/2025 130     LDL/HDL 04/30/2025 2.0     VLDL 04/30/2025 29     Glucose 04/30/2025 88        Last " Imaging:  X-Ray Knee Complete 4 Or More Views Right  Narrative: EXAMINATION:  XR KNEE COMP 4 OR MORE VIEWS RIGHT    CLINICAL HISTORY:  Unilateral primary osteoarthritis, right knee    TECHNIQUE:  Four views of the right knee    COMPARISON:  02/03/2025    FINDINGS:  Very mild degenerative changes.  2 mm calcific density projecting along the lateral aspect of the medial femoral condyle at the intercondylar notch on 1 of the views, apparent PA flexion view although not labeled, possibly spur or small peritendinous calcification or even loose body.  Not seen on prior images but without comparable projection on the prior study.  Small joint effusion.  Acute fracture or dislocation is not seen  Impression: As above    Electronically signed by: Sima Gant MD  Date:    02/25/2025  Time:    09:23         **Labs and x-rays personally reviewed by me    ** reviewed           Assessment & Plan:   Assessment & Plan    IMPRESSION:  - Assessed prediabetes, elevated cholesterol, and BP.  - Evaluated home BP readings, which were within acceptable range.  - Decided against initiating BP medication based on home readings.  - Considered starting cholesterol medication, but patient declined.  - Recommend lifestyle modifications for managing prediabetes and cholesterol.    OBESITY:  - Educated patient on the importance of reducing carbohydrate intake, specifically limiting potatoes, rice, pasta, and bread.  - Patient to follow a low carbohydrate diet and walk for at least 20-30 minutes daily.    FOLLOW-UP:  - Follow up in 3-4 months (September) to reassess condition.           1. Pre-diabetes    2. Dyslipidemia    3. Essential (primary) hypertension    4. Obesity, unspecified class, unspecified obesity type, unspecified whether serious comorbidity present            Mac Arrington MD  This note was generated with the assistance of ambient listening technology. Verbal consent was obtained by the patient and accompanying  visitor(s) for the recording of patient appointment to facilitate this note. I attest to having reviewed and edited the generated note for accuracy, though some syntax or spelling errors may persist. Please contact the author of this note for any clarification.            [1]   Current Outpatient Medications:     dexbrompheniramine-phenylep-DM 2-10-20 mg Tab, Take 1 tablet by mouth every 4 (four) hours as needed (cough/congestion)., Disp: 20 tablet, Rfl: 1

## 2025-05-28 ENCOUNTER — PATIENT OUTREACH (OUTPATIENT)
Facility: HOSPITAL | Age: 55
End: 2025-05-28
Payer: COMMERCIAL

## 2025-05-28 NOTE — PROGRESS NOTES
Population Health Chart Review & Patient Outreach Details      Further Action Needed If Patient Returns Outreach:        Health Maintenance Due   Topic Date Due    Colorectal Cancer Screening  Never done    Shingles Vaccine (1 of 2) Never done    Pneumococcal Vaccines (Age 50+) (1 of 1 - PCV) Never done    COVID-19 Vaccine (3 - 2024-25 season) 2024          Updates Requested / Reviewed:     []  Care Everywhere    []     []  External Sources ([facility], [facility], [facility], etc.)    [] [facility]   [] [facility]   [] Other:    []  Care Team Updated   []  Removed  or Duplicate Orders   []  Immunization Reconciliation Completed / Queried    [] [place]   [] [place]   [] [place]   [] [place]      Health Maintenance Topics Addressed and Outreach Outcomes / Actions Taken:             Breast Cancer Screening []  Mammogram Order Placed    []  Mammogram Screening Scheduled    []  External Records Requested & Care Team Updated if Applicable    []  External Records Uploaded & Care Team Updated if Applicable    []  Pt Declined Scheduling Mammogram    []  Pt Will Schedule with External Provider / Order Routed & Care Team Updated if Applicable              Cervical Cancer Screening []  Pap Smear Scheduled in Primary Care or OBGYN    []  External Records Requested & Care Team Updated if Applicable       []  External Records Uploaded, Care Team Updated, & History Updated if Applicable    []  Patient Declined Scheduling Pap Smear    []  Patient Will Schedule with External Provider & Care Team Updated if Applicable                  Colorectal Cancer Screening []  Colonoscopy Case Request / Referral / Home Test Order Placed    []  External Records Requested & Care Team Updated if Applicable    []  External Records Uploaded, Care Team Updated, & History Updated if Applicable    []  Patient Declined Completing Colon Cancer Screening    []  Patient Will Schedule with External Provider & Care Team Updated if Applicable    []   Fit Kit Mailed (add the SmartPhrase under additional notes)    []  Reminded Patient to Complete Home Test                Diabetic Eye Exam []  Eye Exam Screening Order Placed    []  Eye Camera Scheduled or Optometry/Ophthalmology Referral Placed    []  External Records Requested & Care Team Updated if Applicable    []  External Records Uploaded, Care Team Updated, & History Updated if Applicable    []  Patient Declined Scheduling Eye Exam    []  Patient Will Schedule with External Provider & Care Team Updated if Applicable             Blood Pressure Control []  Primary Care Follow Up Visit Scheduled     []  Remote Blood Pressure Reading Captured    []  Patient Declined Remote Reading or Scheduling Appt - Escalated to PCP    []  Patient Will Call Back or Send Portal Message with Reading                 HbA1c & Other Labs []  Overdue Lab(s) Ordered    []  Overdue Lab(s) Scheduled    []  External Records Uploaded & Care Team Updated if Applicable    []  Primary Care Follow Up Visit Scheduled     []  Reminded Patient to Complete A1c Home Test    []  Patient Declined Scheduling Labs or Will Call Back to Schedule    []  Patient Will Schedule with External Provider / Order Routed, & Care Team Updated if Applicable           Primary Care Appointment []  Primary Care Appt Scheduled    []  Patient Declined Scheduling or Will Call Back to Schedule    []  Pt Established with External Provider, Updated Care Team, & Informed Pt to Notify Payor if Applicable           Medication Adherence /    Statin Use []  Primary Care Appointment Scheduled    []  Patient Reminded to  Prescription    []  Patient Declined, Provider Notified if Needed    []  Sent Provider Message to Review to Evaluate Pt for Statin, Add Exclusion Dx Codes, Document   Exclusion in Problem List, Change Statin Intensity Level to Moderate or High Intensity if Applicable                Osteoporosis Screening []  Dexa Order Placed    []  Dexa Appointment  Scheduled    []  External Records Requested & Care Team Updated    []  External Records Uploaded, Care Team Updated, & History Updated if Applicable    []  Patient Declined Scheduling Dexa or Will Call Back to Schedule    []  Patient Will Schedule with External Provider / Order Routed & Care Team Updated if Applicable       Additional Notes:       Post visit Population Health review of encounter with date of service  5/19/25 with Murphy.  Not all required CM[facility]  components in encounter.  Needs primary dx as HTN/chol message sent.  Followup appt for: 4/29/25 HY  5/19/25 [facility] needs primary dx as dyslipidemia because prediatic is not a CM[facility] dx for [employer]  Received: Today  Estefani Beltran LPN  P Murphy Guajardo Staff  Please have provider change primary visit dx for 5/19/25 visit to Dyslipidemia for [facility] compliance.  FYI prediabetic is not a glucose dx that [employer] will accept for a CM[facility] visit. Thanks,Estefani

## 2025-06-02 ENCOUNTER — OFFICE VISIT (OUTPATIENT)
Dept: SURGERY | Facility: CLINIC | Age: 55
End: 2025-06-02
Attending: SURGERY
Payer: COMMERCIAL

## 2025-06-02 DIAGNOSIS — K46.9 ABDOMINAL HERNIA WITHOUT OBSTRUCTION AND WITHOUT GANGRENE, RECURRENCE NOT SPECIFIED, UNSPECIFIED HERNIA TYPE: ICD-10-CM

## 2025-06-02 PROCEDURE — 99024 POSTOP FOLLOW-UP VISIT: CPT | Mod: ,,, | Performed by: SURGERY

## 2025-06-02 PROCEDURE — 99999 PR PBB SHADOW E&M-EST. PATIENT-LVL II: CPT | Mod: PBBFAC,,, | Performed by: SURGERY

## 2025-06-02 PROCEDURE — 99212 OFFICE O/P EST SF 10 MIN: CPT | Mod: PBBFAC | Performed by: SURGERY

## 2025-06-02 PROCEDURE — 3044F HG A1C LEVEL LT 7.0%: CPT | Mod: ,,, | Performed by: SURGERY

## 2025-07-08 ENCOUNTER — OFFICE VISIT (OUTPATIENT)
Dept: FAMILY MEDICINE | Facility: CLINIC | Age: 55
End: 2025-07-08
Payer: COMMERCIAL

## 2025-07-08 VITALS
WEIGHT: 250 LBS | HEART RATE: 91 BPM | TEMPERATURE: 98 F | DIASTOLIC BLOOD PRESSURE: 82 MMHG | SYSTOLIC BLOOD PRESSURE: 127 MMHG | OXYGEN SATURATION: 95 % | HEIGHT: 70 IN | RESPIRATION RATE: 20 BRPM | BODY MASS INDEX: 35.79 KG/M2

## 2025-07-08 DIAGNOSIS — S61.432A PUNCTURE WOUND OF LEFT HAND, FOREIGN BODY PRESENCE UNSPECIFIED, INITIAL ENCOUNTER: Primary | ICD-10-CM

## 2025-07-08 PROCEDURE — 3079F DIAST BP 80-89 MM HG: CPT | Mod: ,,, | Performed by: NURSE PRACTITIONER

## 2025-07-08 PROCEDURE — 90471 IMMUNIZATION ADMIN: CPT | Mod: ,,, | Performed by: NURSE PRACTITIONER

## 2025-07-08 PROCEDURE — 3074F SYST BP LT 130 MM HG: CPT | Mod: ,,, | Performed by: NURSE PRACTITIONER

## 2025-07-08 PROCEDURE — 99051 MED SERV EVE/WKEND/HOLIDAY: CPT | Mod: ,,, | Performed by: NURSE PRACTITIONER

## 2025-07-08 PROCEDURE — 3008F BODY MASS INDEX DOCD: CPT | Mod: ,,, | Performed by: NURSE PRACTITIONER

## 2025-07-08 PROCEDURE — 99213 OFFICE O/P EST LOW 20 MIN: CPT | Mod: 25,,, | Performed by: NURSE PRACTITIONER

## 2025-07-08 PROCEDURE — 1159F MED LIST DOCD IN RCRD: CPT | Mod: ,,, | Performed by: NURSE PRACTITIONER

## 2025-07-08 PROCEDURE — 90715 TDAP VACCINE 7 YRS/> IM: CPT | Mod: ,,, | Performed by: NURSE PRACTITIONER

## 2025-07-08 PROCEDURE — 3044F HG A1C LEVEL LT 7.0%: CPT | Mod: ,,, | Performed by: NURSE PRACTITIONER

## 2025-07-08 PROCEDURE — 1160F RVW MEDS BY RX/DR IN RCRD: CPT | Mod: ,,, | Performed by: NURSE PRACTITIONER

## 2025-07-08 RX ORDER — SULFAMETHOXAZOLE AND TRIMETHOPRIM 800; 160 MG/1; MG/1
1 TABLET ORAL 2 TIMES DAILY
Qty: 14 TABLET | Refills: 0 | Status: SHIPPED | OUTPATIENT
Start: 2025-07-08 | End: 2025-07-15

## 2025-07-08 NOTE — PROGRESS NOTES
"Subjective:       Patient ID: Zachariah Aguilar is a 55 y.o. male.    Chief Complaint: Puncture wound to left hand      Presents to clinic with complaint of puncture wound to the left hand.  Patient reports he was helping his dad with some things and stuck his hand with a needle that was used to give an animal an injection.  The puncture wound was between the thumb and 2nd finger.  This happened today.  He is unsure when his last tetanus shot was given.      Review of Systems   Constitutional: Negative.    Respiratory: Negative.     Cardiovascular: Negative.           Reviewed family, medical, surgical, and social history.    Objective:      /82 (BP Location: Right arm, Patient Position: Sitting)   Pulse 91   Temp 98.2 °F (36.8 °C) (Oral)   Resp 20   Ht 5' 10" (1.778 m)   Wt 113.4 kg (250 lb)   SpO2 95%   BMI 35.87 kg/m²   Physical Exam  Vitals and nursing note reviewed.   Constitutional:       General: He is not in acute distress.     Appearance: Normal appearance. He is not ill-appearing, toxic-appearing or diaphoretic.   HENT:      Head: Normocephalic.      Mouth/Throat:      Mouth: Mucous membranes are moist.   Cardiovascular:      Rate and Rhythm: Normal rate and regular rhythm.      Heart sounds: Normal heart sounds.   Pulmonary:      Effort: Pulmonary effort is normal.      Breath sounds: Normal breath sounds.   Musculoskeletal:      Cervical back: Normal range of motion and neck supple.      Comments:   There is a tiny puncture wound to the soft tissue between the thumb and 2nd finger on the left hand.  There is no redness, swelling, warmth, or drainage.  He has normal range of motion with intact flexors and extensors of all the fingers and hand.  His hand  are equal.  Neurovascularly intact.   Skin:     General: Skin is warm and dry.      Capillary Refill: Capillary refill takes less than 2 seconds.   Neurological:      Mental Status: He is alert and oriented to person, place, and time. "   Psychiatric:         Mood and Affect: Mood normal.         Behavior: Behavior normal.         Thought Content: Thought content normal.         Judgment: Judgment normal.            No visits with results within 1 Day(s) from this visit.   Latest known visit with results is:   Office Visit on 04/29/2025   Component Date Value Ref Range Status    Hemoglobin A1C 04/29/2025 6.0  <=7.0 % Final      Normal:               <5.7%  Pre-Diabetic:       5.7% to 6.4%  Diabetic:             >6.4%  Diabetic Goal:     <7%    Estimated Average Glucose 04/29/2025 126  mg/dL Final    Hepatitis C Ab 04/29/2025 Non-Reactive  Non-Reactive Final    HIV 1/2 04/29/2025 Non-Reactive  Non-Reactive Final    Triglycerides 04/30/2025 147 (H)  34 - 140 mg/dL Final      Normal:  <150 mg/dL  Borderline High: 150-199 mg/dL  High:   200-499 mg/dL  Very High:  >=500    Cholesterol 04/30/2025 225 (H)  <=200 mg/dL Final      <200 mg/dL:  Desirable  200-240 mg/dL: Borderline High  >240 mg/dL:  High    HDL Cholesterol 04/30/2025 66 (H)  35 - 60 mg/dL Final      <40 mg/dL: Low HDL  40-60 mg/dL: Normal  >60 mg/dL: Desirable    Cholesterol/HDL Ratio (Risk Factor) 04/30/2025 3.4   Final    Non-HDL 04/30/2025 159  mg/dL Final    LDL Calculated 04/30/2025 130  mg/dL Final    LDL calculated using the Friedewald equation.    LDL/HDL 04/30/2025 2.0   Final    VLDL 04/30/2025 29  mg/dL Final    Glucose 04/30/2025 88  74 - 100 mg/dL Final      Assessment:       1. Puncture wound of left hand, foreign body presence unspecified, initial encounter        Plan:       Puncture wound of left hand, foreign body presence unspecified, initial encounter  -     X-Ray Hand 3 View Left; Future; Expected date: 07/08/2025  -     Tdap vaccine injection 0.5 mL  -     sulfamethoxazole-trimethoprim 800-160mg (BACTRIM DS) 800-160 mg Tab; Take 1 tablet by mouth 2 (two) times daily. for 7 days  Dispense: 14 tablet; Refill: 0    I will call if Dr. Pupa sees anything on x-ray I did not  see.  I did not see a foreign body.  He was given a Tdap booster and prophylactic antibiotics   Return to the clinic as needed          Risks, benefits, and side effects were discussed with the patient. All questions were answered to the fullest satisfaction of the patient, and pt verbalized understanding and agreement to treatment plan. Pt was to call with any new or worsening symptoms, or present to the ER.

## (undated) DEVICE — GLOVE SENSICARE PI SURG 6.5

## (undated) DEVICE — PENCIL ELECTROSURG HOLST W/BLD

## (undated) DEVICE — GLOVE SENSICARE PI SURG 8

## (undated) DEVICE — ELECTRODE BLADE INSULATED 1 IN

## (undated) DEVICE — TRAY VAGINAL WET PREP

## (undated) DEVICE — KIT BASIC RUSH

## (undated) DEVICE — SYR 10CC LUER LOCK

## (undated) DEVICE — SUT VICRYL 3-0 27 SH

## (undated) DEVICE — SUT PROLENE BLU 2-0 SH 48IN

## (undated) DEVICE — GOWN NONREINF SET-IN SLV 2XL

## (undated) DEVICE — DRAIN PENROSE STD 18X1/2IN

## (undated) DEVICE — SUT 2/0 36IN COATED VICRYL

## (undated) DEVICE — SUT VICRYL PLUS 4-0 FS-2 27IN

## (undated) DEVICE — GLOVE SENSICARE PI GRN 6.5

## (undated) DEVICE — DRAPE INCISE IOBAN 2 13X13IN

## (undated) DEVICE — SOL NACL IRR 1000ML BTL

## (undated) DEVICE — TRAY CATH 1-LYR URIMTR 16FR